# Patient Record
Sex: FEMALE | Race: WHITE | NOT HISPANIC OR LATINO
[De-identification: names, ages, dates, MRNs, and addresses within clinical notes are randomized per-mention and may not be internally consistent; named-entity substitution may affect disease eponyms.]

---

## 2017-03-10 ENCOUNTER — APPOINTMENT (OUTPATIENT)
Dept: PULMONOLOGY | Facility: CLINIC | Age: 77
End: 2017-03-10

## 2017-03-10 VITALS
HEART RATE: 74 BPM | DIASTOLIC BLOOD PRESSURE: 60 MMHG | HEIGHT: 58 IN | OXYGEN SATURATION: 90 % | SYSTOLIC BLOOD PRESSURE: 120 MMHG | BODY MASS INDEX: 20.69 KG/M2

## 2017-03-10 VITALS — BODY MASS INDEX: 20.69 KG/M2 | WEIGHT: 99 LBS

## 2017-06-14 ENCOUNTER — APPOINTMENT (OUTPATIENT)
Dept: PULMONOLOGY | Facility: CLINIC | Age: 77
End: 2017-06-14

## 2017-06-14 VITALS
WEIGHT: 99 LBS | HEART RATE: 73 BPM | OXYGEN SATURATION: 97 % | BODY MASS INDEX: 20.69 KG/M2 | DIASTOLIC BLOOD PRESSURE: 64 MMHG | SYSTOLIC BLOOD PRESSURE: 110 MMHG

## 2017-06-14 RX ORDER — PREDNISONE 50 MG/1
50 TABLET ORAL
Qty: 5 | Refills: 0 | Status: COMPLETED | COMMUNITY
Start: 2017-03-10 | End: 2017-06-14

## 2017-06-14 RX ORDER — LEVALBUTEROL HYDROCHLORIDE 1.25 MG/3ML
1.25 SOLUTION RESPIRATORY (INHALATION)
Qty: 288 | Refills: 0 | Status: DISCONTINUED | COMMUNITY
Start: 2017-03-10

## 2017-07-25 ENCOUNTER — APPOINTMENT (OUTPATIENT)
Dept: PULMONOLOGY | Facility: CLINIC | Age: 77
End: 2017-07-25

## 2017-07-25 VITALS — WEIGHT: 100 LBS | BODY MASS INDEX: 20.9 KG/M2

## 2017-07-25 VITALS — SYSTOLIC BLOOD PRESSURE: 118 MMHG | HEART RATE: 74 BPM | OXYGEN SATURATION: 93 % | DIASTOLIC BLOOD PRESSURE: 74 MMHG

## 2017-10-27 ENCOUNTER — APPOINTMENT (OUTPATIENT)
Dept: PULMONOLOGY | Facility: CLINIC | Age: 77
End: 2017-10-27
Payer: MEDICARE

## 2017-10-27 VITALS
DIASTOLIC BLOOD PRESSURE: 60 MMHG | SYSTOLIC BLOOD PRESSURE: 110 MMHG | BODY MASS INDEX: 20.27 KG/M2 | HEART RATE: 77 BPM | OXYGEN SATURATION: 96 % | WEIGHT: 97 LBS

## 2017-10-27 PROCEDURE — 99215 OFFICE O/P EST HI 40 MIN: CPT

## 2018-01-23 ENCOUNTER — APPOINTMENT (OUTPATIENT)
Dept: PULMONOLOGY | Facility: CLINIC | Age: 78
End: 2018-01-23
Payer: MEDICARE

## 2018-01-23 VITALS — BODY MASS INDEX: 20.48 KG/M2 | SYSTOLIC BLOOD PRESSURE: 120 MMHG | WEIGHT: 98 LBS | DIASTOLIC BLOOD PRESSURE: 60 MMHG

## 2018-01-23 VITALS — HEART RATE: 79 BPM | OXYGEN SATURATION: 94 %

## 2018-01-23 PROCEDURE — 99214 OFFICE O/P EST MOD 30 MIN: CPT

## 2018-02-09 ENCOUNTER — RECORD ABSTRACTING (OUTPATIENT)
Age: 78
End: 2018-02-09

## 2018-02-09 DIAGNOSIS — R26.9 UNSPECIFIED ABNORMALITIES OF GAIT AND MOBILITY: ICD-10-CM

## 2018-02-09 RX ORDER — ATORVASTATIN CALCIUM 10 MG/1
10 TABLET, FILM COATED ORAL DAILY
Refills: 0 | Status: ACTIVE | COMMUNITY
Start: 2017-07-25

## 2018-03-19 ENCOUNTER — APPOINTMENT (OUTPATIENT)
Dept: CARDIOLOGY | Facility: CLINIC | Age: 78
End: 2018-03-19
Payer: MEDICARE

## 2018-03-19 VITALS
RESPIRATION RATE: 12 BRPM | SYSTOLIC BLOOD PRESSURE: 134 MMHG | HEART RATE: 79 BPM | BODY MASS INDEX: 20.81 KG/M2 | DIASTOLIC BLOOD PRESSURE: 68 MMHG | WEIGHT: 99.13 LBS | HEIGHT: 58 IN

## 2018-03-19 PROCEDURE — 93000 ELECTROCARDIOGRAM COMPLETE: CPT

## 2018-03-19 PROCEDURE — 99214 OFFICE O/P EST MOD 30 MIN: CPT

## 2018-04-26 ENCOUNTER — APPOINTMENT (OUTPATIENT)
Dept: PULMONOLOGY | Facility: CLINIC | Age: 78
End: 2018-04-26
Payer: MEDICARE

## 2018-04-26 VITALS — DIASTOLIC BLOOD PRESSURE: 60 MMHG | WEIGHT: 100 LBS | BODY MASS INDEX: 20.9 KG/M2 | SYSTOLIC BLOOD PRESSURE: 120 MMHG

## 2018-04-26 VITALS — HEART RATE: 79 BPM | OXYGEN SATURATION: 94 %

## 2018-04-26 PROCEDURE — 99214 OFFICE O/P EST MOD 30 MIN: CPT

## 2018-05-12 ENCOUNTER — APPOINTMENT (OUTPATIENT)
Dept: CARDIOLOGY | Facility: CLINIC | Age: 78
End: 2018-05-12
Payer: MEDICARE

## 2018-05-12 PROCEDURE — 93978 VASCULAR STUDY: CPT

## 2018-05-14 ENCOUNTER — APPOINTMENT (OUTPATIENT)
Dept: CARDIOLOGY | Facility: CLINIC | Age: 78
End: 2018-05-14

## 2018-05-17 ENCOUNTER — APPOINTMENT (OUTPATIENT)
Dept: CARDIOLOGY | Facility: CLINIC | Age: 78
End: 2018-05-17

## 2018-06-21 ENCOUNTER — RECORD ABSTRACTING (OUTPATIENT)
Age: 78
End: 2018-06-21

## 2018-06-21 RX ORDER — AMOXICILLIN 500 MG/1
500 CAPSULE ORAL
Qty: 12 | Refills: 0 | Status: COMPLETED | COMMUNITY
Start: 2018-01-02

## 2018-07-05 ENCOUNTER — APPOINTMENT (OUTPATIENT)
Dept: CARDIOLOGY | Facility: CLINIC | Age: 78
End: 2018-07-05
Payer: MEDICARE

## 2018-07-05 VITALS
HEIGHT: 58 IN | SYSTOLIC BLOOD PRESSURE: 123 MMHG | DIASTOLIC BLOOD PRESSURE: 70 MMHG | RESPIRATION RATE: 12 BRPM | BODY MASS INDEX: 20.57 KG/M2 | WEIGHT: 98 LBS | HEART RATE: 74 BPM

## 2018-07-05 DIAGNOSIS — Z86.79 PERSONAL HISTORY OF OTHER DISEASES OF THE CIRCULATORY SYSTEM: ICD-10-CM

## 2018-07-05 PROCEDURE — 93000 ELECTROCARDIOGRAM COMPLETE: CPT

## 2018-07-05 PROCEDURE — 99214 OFFICE O/P EST MOD 30 MIN: CPT

## 2018-08-27 ENCOUNTER — APPOINTMENT (OUTPATIENT)
Dept: PULMONOLOGY | Facility: CLINIC | Age: 78
End: 2018-08-27
Payer: MEDICARE

## 2018-08-27 VITALS
SYSTOLIC BLOOD PRESSURE: 118 MMHG | OXYGEN SATURATION: 94 % | HEART RATE: 73 BPM | DIASTOLIC BLOOD PRESSURE: 70 MMHG | RESPIRATION RATE: 16 BRPM

## 2018-08-27 VITALS — HEIGHT: 72 IN

## 2018-08-27 PROCEDURE — 99214 OFFICE O/P EST MOD 30 MIN: CPT | Mod: 25

## 2018-08-27 PROCEDURE — 94010 BREATHING CAPACITY TEST: CPT

## 2018-09-21 ENCOUNTER — APPOINTMENT (OUTPATIENT)
Dept: CARDIOLOGY | Facility: CLINIC | Age: 78
End: 2018-09-21
Payer: MEDICARE

## 2018-09-21 PROCEDURE — 93306 TTE W/DOPPLER COMPLETE: CPT

## 2018-10-29 ENCOUNTER — RX RENEWAL (OUTPATIENT)
Age: 78
End: 2018-10-29

## 2018-11-07 ENCOUNTER — APPOINTMENT (OUTPATIENT)
Dept: CARDIOLOGY | Facility: CLINIC | Age: 78
End: 2018-11-07
Payer: MEDICARE

## 2018-11-07 VITALS
HEIGHT: 60 IN | WEIGHT: 97 LBS | RESPIRATION RATE: 16 BRPM | HEART RATE: 77 BPM | BODY MASS INDEX: 19.04 KG/M2 | DIASTOLIC BLOOD PRESSURE: 63 MMHG | SYSTOLIC BLOOD PRESSURE: 122 MMHG

## 2018-11-07 PROCEDURE — 93000 ELECTROCARDIOGRAM COMPLETE: CPT

## 2018-11-07 PROCEDURE — 99214 OFFICE O/P EST MOD 30 MIN: CPT

## 2018-11-07 NOTE — REASON FOR VISIT
[Follow-Up - Clinic] : a clinic follow-up of [FreeTextEntry1] : The patient is a 77-year-old white female with long-standing history of chronic lung disease/COPD on chronic O2 nasal cannula therapy presents back to the office today for management of history of mild atherosclerotic heart disease, mild valvular insufficiency and borderline hypertension with occasional palpitations of the chest.\par \par She reports that she gets infrequent palpitations of the chest. She has had her usual dyspnea with any significant activity and still using O2 nasal cannula 100% of the time. She had a recent checkup with Dr. Castellano from the pulmonary standpoint who feels her condition is chronic but stable;\par \par She denies chest pain,  dizziness,  or syncope;;

## 2018-11-07 NOTE — PHYSICAL EXAM
[General Appearance - Well Developed] : well developed [Normal Appearance] : normal appearance [Well Groomed] : well groomed [General Appearance - Well Nourished] : well nourished [No Deformities] : no deformities [General Appearance - In No Acute Distress] : no acute distress [Normal Conjunctiva] : the conjunctiva exhibited no abnormalities [Eyelids - No Xanthelasma] : the eyelids demonstrated no xanthelasmas [Normal Oral Mucosa] : normal oral mucosa [No Oral Pallor] : no oral pallor [No Oral Cyanosis] : no oral cyanosis [Normal Jugular Venous A Waves Present] : normal jugular venous A waves present [Normal Jugular Venous V Waves Present] : normal jugular venous V waves present [No Jugular Venous Olivares A Waves] : no jugular venous olivares A waves [Abdomen Soft] : soft [Abdomen Tenderness] : non-tender [] : no hepato-splenomegaly [Abdomen Mass (___ Cm)] : no abdominal mass palpated [Abnormal Walk] : normal gait [Gait - Sufficient For Exercise Testing] : the gait was sufficient for exercise testing [Nail Clubbing] : no clubbing of the fingernails [Cyanosis, Localized] : no localized cyanosis [FreeTextEntry1] : See above noted [Oriented To Time, Place, And Person] : oriented to person, place, and time [Affect] : the affect was normal [Mood] : the mood was normal [No Anxiety] : not feeling anxious

## 2018-11-07 NOTE — HISTORY OF PRESENT ILLNESS
[FreeTextEntry1] : Patient has had some other additional issues of recent which include iron deficient anemia upon evaluation from GI standpoint (Dr. Dickey)-who feels patient's chronic condition does not lend itself to any invasive checkup right now and recommended iron infusions;\par \par There is no overt bleeding noted\par Patient has been seeing hematology (Dr.. Palm);\par \par She was also recently treated for possible cellulitis of the left lower extremity with antibiotics;

## 2018-11-07 NOTE — ASSESSMENT
[FreeTextEntry1] : EKG demonstrated normal sinus rhythm at a rate of 77, biatrial enlargement, poor R-wave progression V1 to V2;\par \par \par In summary, the patient is a 77-year-old woman with history of mild atherosclerosis, abnormal EKG, advanced COPD who is O2 nasal cannula dependent who presents back to the office to discuss results of recent transthoracic echo which demonstrated the following:\par \par Normal cardiac chamber sizes and normal systolic function of the left ventricle with normal ejection fraction in the range of 60-65%, trace AI and PI mild MR and mild elevated PA systolic pressures in the range of 40 mm of mercury;\par \par \par Plan:\par \par No additional cardiac workup indicated at this time\par \par Followup to this office within 4-5 months or p.r.n.\par \par Timely checkups and laboratory blood tests with primary care encouraged;;

## 2018-11-29 ENCOUNTER — APPOINTMENT (OUTPATIENT)
Dept: PULMONOLOGY | Facility: CLINIC | Age: 78
End: 2018-11-29

## 2018-12-04 ENCOUNTER — APPOINTMENT (OUTPATIENT)
Dept: PULMONOLOGY | Facility: CLINIC | Age: 78
End: 2018-12-04
Payer: MEDICARE

## 2018-12-04 VITALS — SYSTOLIC BLOOD PRESSURE: 120 MMHG | BODY MASS INDEX: 18.16 KG/M2 | WEIGHT: 93 LBS | DIASTOLIC BLOOD PRESSURE: 64 MMHG

## 2018-12-04 VITALS — HEART RATE: 74 BPM | OXYGEN SATURATION: 95 %

## 2018-12-04 PROCEDURE — 99214 OFFICE O/P EST MOD 30 MIN: CPT

## 2018-12-04 RX ORDER — LEVALBUTEROL 1.25 MG/.5ML
1.25 SOLUTION, CONCENTRATE RESPIRATORY (INHALATION) 4 TIMES DAILY
Refills: 3 | Status: ACTIVE | COMMUNITY
Start: 2017-03-10

## 2019-03-11 ENCOUNTER — APPOINTMENT (OUTPATIENT)
Dept: CARDIOLOGY | Facility: CLINIC | Age: 79
End: 2019-03-11
Payer: MEDICARE

## 2019-03-11 ENCOUNTER — NON-APPOINTMENT (OUTPATIENT)
Age: 79
End: 2019-03-11

## 2019-03-11 VITALS
DIASTOLIC BLOOD PRESSURE: 74 MMHG | HEIGHT: 60 IN | SYSTOLIC BLOOD PRESSURE: 128 MMHG | RESPIRATION RATE: 16 BRPM | BODY MASS INDEX: 18.65 KG/M2 | WEIGHT: 95 LBS | HEART RATE: 70 BPM

## 2019-03-11 PROCEDURE — 93000 ELECTROCARDIOGRAM COMPLETE: CPT

## 2019-03-11 PROCEDURE — 99214 OFFICE O/P EST MOD 30 MIN: CPT

## 2019-03-11 NOTE — HISTORY OF PRESENT ILLNESS
[FreeTextEntry1] : She has not had a recent significant symptoms of chest pain, palpitations, dizziness or syncope;\par \par She is in the process and planning to have some additional significant treatment and dental work in the near future;\par \par

## 2019-03-11 NOTE — REASON FOR VISIT
[Follow-Up - Clinic] : a clinic follow-up of [FreeTextEntry1] : The patient is an 78-year-old white female with a known history of advanced COPD and chronic O2 nasal cannula dependent who presents back to the office today for general cardiac checkup.;\par \par She has a history of an abnormal EKG, occasional palpitations in the chest, and mild valvular insufficiency;

## 2019-03-11 NOTE — ASSESSMENT
[FreeTextEntry1] : EKG shows normal sinus rhythm at a rate of 70 with some poor R wave progression V1 to V2 and noted peak T waves;\par \par In summary the patient is a-year-old woman with known history of advanced COPD and O2 nasal cannula oxygen dependent who has had a history of mild insufficiency (MR TR, and otherwise stable cardiac pattern with no significant coronary artery disease or prior cardiac catheterization but positive for significant to severe pulmonary hypertension;\par \par Plan:\par \par No additional cardiac workup indicated at this time;\par \par Patient to followup to primary care, pulmonary and hematology for history of iron deficient anemia and prior history of iron infusions and sees Dr. Palm);\par \par Followup in this office within 6 months or p.r.n.

## 2019-03-27 ENCOUNTER — EMERGENCY (EMERGENCY)
Facility: HOSPITAL | Age: 79
LOS: 1 days | Discharge: HOME | End: 2019-03-27
Attending: EMERGENCY MEDICINE
Payer: MEDICARE

## 2019-03-27 VITALS
RESPIRATION RATE: 18 BRPM | SYSTOLIC BLOOD PRESSURE: 127 MMHG | HEART RATE: 72 BPM | TEMPERATURE: 98 F | DIASTOLIC BLOOD PRESSURE: 74 MMHG | OXYGEN SATURATION: 98 %

## 2019-03-27 VITALS
OXYGEN SATURATION: 98 % | TEMPERATURE: 98 F | SYSTOLIC BLOOD PRESSURE: 132 MMHG | DIASTOLIC BLOOD PRESSURE: 73 MMHG | RESPIRATION RATE: 18 BRPM | HEART RATE: 77 BPM

## 2019-03-27 LAB
ALBUMIN SERPL ELPH-MCNC: 4 G/DL — SIGNIFICANT CHANGE UP (ref 3.3–5.2)
ALP SERPL-CCNC: 128 U/L — HIGH (ref 40–120)
ALT FLD-CCNC: 32 U/L — SIGNIFICANT CHANGE UP
ANION GAP SERPL CALC-SCNC: 11 MMOL/L — SIGNIFICANT CHANGE UP (ref 5–17)
APTT BLD: 30.3 SEC — SIGNIFICANT CHANGE UP (ref 27.5–36.3)
AST SERPL-CCNC: 41 U/L — HIGH
BASE EXCESS BLDV CALC-SCNC: 7.1 MMOL/L — HIGH (ref -2–2)
BASOPHILS # BLD AUTO: 0 K/UL — SIGNIFICANT CHANGE UP (ref 0–0.2)
BASOPHILS NFR BLD AUTO: 0.4 % — SIGNIFICANT CHANGE UP (ref 0–2)
BILIRUB SERPL-MCNC: 0.3 MG/DL — LOW (ref 0.4–2)
BLOOD GAS COMMENTS, VENOUS: SIGNIFICANT CHANGE UP
BUN SERPL-MCNC: 9 MG/DL — SIGNIFICANT CHANGE UP (ref 8–20)
CALCIUM SERPL-MCNC: 9.6 MG/DL — SIGNIFICANT CHANGE UP (ref 8.6–10.2)
CHLORIDE SERPL-SCNC: 100 MMOL/L — SIGNIFICANT CHANGE UP (ref 98–107)
CK SERPL-CCNC: 45 U/L — SIGNIFICANT CHANGE UP (ref 25–170)
CK SERPL-CCNC: 69 U/L — SIGNIFICANT CHANGE UP (ref 25–170)
CO2 SERPL-SCNC: 31 MMOL/L — HIGH (ref 22–29)
CREAT SERPL-MCNC: 0.64 MG/DL — SIGNIFICANT CHANGE UP (ref 0.5–1.3)
EOSINOPHIL # BLD AUTO: 0.3 K/UL — SIGNIFICANT CHANGE UP (ref 0–0.5)
EOSINOPHIL NFR BLD AUTO: 4.1 % — SIGNIFICANT CHANGE UP (ref 0–6)
GAS PNL BLDV: SIGNIFICANT CHANGE UP
GLUCOSE SERPL-MCNC: 111 MG/DL — SIGNIFICANT CHANGE UP (ref 70–115)
HCO3 BLDV-SCNC: 30 MMOL/L — HIGH (ref 21–29)
HCT VFR BLD CALC: 39.7 % — SIGNIFICANT CHANGE UP (ref 37–47)
HGB BLD-MCNC: 12.5 G/DL — SIGNIFICANT CHANGE UP (ref 12–16)
HOROWITZ INDEX BLDV+IHG-RTO: SIGNIFICANT CHANGE UP
INR BLD: 1 RATIO — SIGNIFICANT CHANGE UP (ref 0.88–1.16)
LIDOCAIN IGE QN: 50 U/L — SIGNIFICANT CHANGE UP (ref 22–51)
LYMPHOCYTES # BLD AUTO: 1 K/UL — SIGNIFICANT CHANGE UP (ref 1–4.8)
LYMPHOCYTES # BLD AUTO: 11.7 % — LOW (ref 20–55)
MCHC RBC-ENTMCNC: 31.2 PG — HIGH (ref 27–31)
MCHC RBC-ENTMCNC: 31.5 G/DL — LOW (ref 32–36)
MCV RBC AUTO: 99 FL — SIGNIFICANT CHANGE UP (ref 81–99)
MONOCYTES # BLD AUTO: 0.4 K/UL — SIGNIFICANT CHANGE UP (ref 0–0.8)
MONOCYTES NFR BLD AUTO: 4.9 % — SIGNIFICANT CHANGE UP (ref 3–10)
NEUTROPHILS # BLD AUTO: 6.4 K/UL — SIGNIFICANT CHANGE UP (ref 1.8–8)
NEUTROPHILS NFR BLD AUTO: 78.7 % — HIGH (ref 37–73)
PCO2 BLDV: 63 MMHG — HIGH (ref 35–50)
PH BLDV: 7.35 — SIGNIFICANT CHANGE UP (ref 7.32–7.43)
PLATELET # BLD AUTO: 286 K/UL — SIGNIFICANT CHANGE UP (ref 150–400)
PO2 BLDV: 44 MMHG — SIGNIFICANT CHANGE UP (ref 25–45)
POTASSIUM SERPL-MCNC: 4.6 MMOL/L — SIGNIFICANT CHANGE UP (ref 3.5–5.3)
POTASSIUM SERPL-SCNC: 4.6 MMOL/L — SIGNIFICANT CHANGE UP (ref 3.5–5.3)
PROT SERPL-MCNC: 7 G/DL — SIGNIFICANT CHANGE UP (ref 6.6–8.7)
PROTHROM AB SERPL-ACNC: 11.5 SEC — SIGNIFICANT CHANGE UP (ref 10–12.9)
RBC # BLD: 4.01 M/UL — LOW (ref 4.4–5.2)
RBC # FLD: 12.6 % — SIGNIFICANT CHANGE UP (ref 11–15.6)
SAO2 % BLDV: 81 % — SIGNIFICANT CHANGE UP
SODIUM SERPL-SCNC: 142 MMOL/L — SIGNIFICANT CHANGE UP (ref 135–145)
TROPONIN T SERPL-MCNC: <0.01 NG/ML — SIGNIFICANT CHANGE UP (ref 0–0.06)
TROPONIN T SERPL-MCNC: <0.01 NG/ML — SIGNIFICANT CHANGE UP (ref 0–0.06)
WBC # BLD: 8.1 K/UL — SIGNIFICANT CHANGE UP (ref 4.8–10.8)
WBC # FLD AUTO: 8.1 K/UL — SIGNIFICANT CHANGE UP (ref 4.8–10.8)

## 2019-03-27 PROCEDURE — 83690 ASSAY OF LIPASE: CPT

## 2019-03-27 PROCEDURE — 80053 COMPREHEN METABOLIC PANEL: CPT

## 2019-03-27 PROCEDURE — 99222 1ST HOSP IP/OBS MODERATE 55: CPT

## 2019-03-27 PROCEDURE — 70450 CT HEAD/BRAIN W/O DYE: CPT

## 2019-03-27 PROCEDURE — 93010 ELECTROCARDIOGRAM REPORT: CPT

## 2019-03-27 PROCEDURE — 85610 PROTHROMBIN TIME: CPT

## 2019-03-27 PROCEDURE — 85027 COMPLETE CBC AUTOMATED: CPT

## 2019-03-27 PROCEDURE — 99284 EMERGENCY DEPT VISIT MOD MDM: CPT | Mod: 25

## 2019-03-27 PROCEDURE — 99284 EMERGENCY DEPT VISIT MOD MDM: CPT

## 2019-03-27 PROCEDURE — 71046 X-RAY EXAM CHEST 2 VIEWS: CPT | Mod: 26

## 2019-03-27 PROCEDURE — 85730 THROMBOPLASTIN TIME PARTIAL: CPT

## 2019-03-27 PROCEDURE — 84484 ASSAY OF TROPONIN QUANT: CPT

## 2019-03-27 PROCEDURE — 82803 BLOOD GASES ANY COMBINATION: CPT

## 2019-03-27 PROCEDURE — 36415 COLL VENOUS BLD VENIPUNCTURE: CPT

## 2019-03-27 PROCEDURE — 82550 ASSAY OF CK (CPK): CPT

## 2019-03-27 PROCEDURE — 93005 ELECTROCARDIOGRAM TRACING: CPT

## 2019-03-27 PROCEDURE — 71046 X-RAY EXAM CHEST 2 VIEWS: CPT

## 2019-03-27 PROCEDURE — 70450 CT HEAD/BRAIN W/O DYE: CPT | Mod: 26

## 2019-03-27 RX ORDER — TIOTROPIUM BROMIDE AND OLODATEROL 3.124; 2.736 UG/1; UG/1
2 SPRAY, METERED RESPIRATORY (INHALATION)
Qty: 0 | Refills: 0 | COMMUNITY

## 2019-03-27 RX ORDER — FOLIC ACID 0.8 MG
1 TABLET ORAL
Qty: 0 | Refills: 0 | COMMUNITY

## 2019-03-27 RX ORDER — SODIUM CHLORIDE 9 MG/ML
500 INJECTION INTRAMUSCULAR; INTRAVENOUS; SUBCUTANEOUS ONCE
Qty: 0 | Refills: 0 | Status: COMPLETED | OUTPATIENT
Start: 2019-03-27 | End: 2019-03-27

## 2019-03-27 RX ORDER — OMEPRAZOLE 10 MG/1
1 CAPSULE, DELAYED RELEASE ORAL
Qty: 0 | Refills: 0 | COMMUNITY

## 2019-03-27 RX ORDER — ATENOLOL 25 MG/1
0 TABLET ORAL
Qty: 0 | Refills: 0 | COMMUNITY

## 2019-03-27 RX ORDER — ATORVASTATIN CALCIUM 80 MG/1
1 TABLET, FILM COATED ORAL
Qty: 0 | Refills: 0 | COMMUNITY

## 2019-03-27 RX ORDER — AMOXICILLIN 250 MG/5ML
975 SUSPENSION, RECONSTITUTED, ORAL (ML) ORAL
Qty: 0 | Refills: 0 | COMMUNITY

## 2019-03-27 RX ORDER — ASPIRIN/CALCIUM CARB/MAGNESIUM 324 MG
1 TABLET ORAL
Qty: 0 | Refills: 0 | COMMUNITY

## 2019-03-27 RX ORDER — LEVALBUTEROL 1.25 MG/.5ML
0 SOLUTION, CONCENTRATE RESPIRATORY (INHALATION)
Qty: 0 | Refills: 0 | COMMUNITY

## 2019-03-27 RX ORDER — AMITRIPTYLINE HCL 25 MG
1 TABLET ORAL
Qty: 0 | Refills: 0 | COMMUNITY

## 2019-03-27 RX ORDER — ALPRAZOLAM 0.25 MG
1 TABLET ORAL
Qty: 0 | Refills: 0 | COMMUNITY

## 2019-03-27 RX ADMIN — SODIUM CHLORIDE 500 MILLILITER(S): 9 INJECTION INTRAMUSCULAR; INTRAVENOUS; SUBCUTANEOUS at 13:40

## 2019-03-27 NOTE — ED ADULT NURSE NOTE - NSIMPLEMENTINTERV_GEN_ALL_ED
Implemented All Universal Safety Interventions:  Jud to call system. Call bell, personal items and telephone within reach. Instruct patient to call for assistance. Room bathroom lighting operational. Non-slip footwear when patient is off stretcher. Physically safe environment: no spills, clutter or unnecessary equipment. Stretcher in lowest position, wheels locked, appropriate side rails in place.

## 2019-03-27 NOTE — ED ADULT TRIAGE NOTE - CHIEF COMPLAINT QUOTE
pt presents to ED c/o intermittent chest discomfort pt presents to ED c/o intermittent chest discomfort since yesterday.  also has SOB.  hx pulmonary edema.

## 2019-03-27 NOTE — ED ADULT NURSE NOTE - CHIEF COMPLAINT QUOTE
pt presents to ED c/o intermittent chest discomfort since yesterday.  also has SOB.  hx pulmonary edema.

## 2019-03-27 NOTE — ED ADULT NURSE NOTE - CHPI ED NUR SYMPTOMS NEG
no shortness of breath/no congestion/no fever/no back pain/no nausea/no diaphoresis/no chills/no syncope/no vomiting/no dizziness

## 2019-03-27 NOTE — ED ADULT NURSE NOTE - CAS EDN DISCHARGE ASSESSMENT
Awake/No adverse reaction to first time med in ED/Alert and oriented to person, place and time/Patient baseline mental status/Dressing clean and dry/Symptoms improved

## 2019-03-27 NOTE — ED PROVIDER NOTE - CLINICAL SUMMARY MEDICAL DECISION MAKING FREE TEXT BOX
Pt with atypical chest [pain, trop neg x 2 and seen and cleared by cardio. Pt had lightheadedness, resolved with IVF and likely related to clinical dehydration. Now tolerating PO. Stable for dc

## 2019-03-27 NOTE — ED PROVIDER NOTE - OBJECTIVE STATEMENT
78 year old female with PMh including COPD/Pul HTN on home O2, HTN presents with chest pain. Pt states that beginning yesterday she began to have an intermittent chest pain. Pain is described as a soreness, no radiation, and not related to deep inspiration, exertion, position. She denies worsening of her baseline dyspnea, and no cough, abd pain, vomiting, diarrhea. 78 year old female with PMh including COPD/Pul HTN on home O2, HTN presents with chest pain. Pt states that beginning yesterday she began to have an intermittent chest pain. Pain is described as a soreness, no radiation, and not related to deep inspiration, exertion, position. She denies worsening of her baseline dyspnea, and no cough, abd pain, vomiting, diarrhea.  Of note, pt recently had tooth pulled and has had decreased PO intake since then

## 2019-03-27 NOTE — ED PROVIDER NOTE - ENMT, MLM
Airway patent, Nasal mucosa clear. Mouth with normal mucosa. Throat has no vesicles, no oropharyngeal exudates and uvula is midline. Airway patent, Nasal mucosa clear. Mouth with normal mucosa. Throat has no vesicles, no oropharyngeal exudates and uvula is midline. +dry mucus membranes

## 2019-03-27 NOTE — ED ADULT NURSE NOTE - OBJECTIVE STATEMENT
pt AOX4 c/o chest discomfort this morning and "not feeling well" pt has hx of pulmonary HTN and uses oxygen at all times. pt denies any SOB, palpitations, fevers, chills, N/V

## 2019-04-04 ENCOUNTER — APPOINTMENT (OUTPATIENT)
Dept: PULMONOLOGY | Facility: CLINIC | Age: 79
End: 2019-04-04
Payer: MEDICARE

## 2019-04-04 VITALS — BODY MASS INDEX: 19.31 KG/M2 | WEIGHT: 92 LBS | HEIGHT: 58 IN

## 2019-04-04 VITALS — OXYGEN SATURATION: 95 % | SYSTOLIC BLOOD PRESSURE: 126 MMHG | DIASTOLIC BLOOD PRESSURE: 60 MMHG | HEART RATE: 76 BPM

## 2019-04-04 PROBLEM — J43.9 EMPHYSEMA, UNSPECIFIED: Chronic | Status: ACTIVE | Noted: 2019-03-27

## 2019-04-04 PROBLEM — M48.00 SPINAL STENOSIS, SITE UNSPECIFIED: Chronic | Status: ACTIVE | Noted: 2019-03-27

## 2019-04-04 PROBLEM — I27.20 PULMONARY HYPERTENSION, UNSPECIFIED: Chronic | Status: ACTIVE | Noted: 2019-03-27

## 2019-04-04 PROBLEM — J44.9 CHRONIC OBSTRUCTIVE PULMONARY DISEASE, UNSPECIFIED: Chronic | Status: ACTIVE | Noted: 2019-03-27

## 2019-04-04 PROCEDURE — 99215 OFFICE O/P EST HI 40 MIN: CPT | Mod: 25

## 2019-04-04 PROCEDURE — 94010 BREATHING CAPACITY TEST: CPT

## 2019-04-04 NOTE — HISTORY OF PRESENT ILLNESS
[Doing Well] : doing well [Difficulty Breathing During Exertion] : stable dyspnea on exertion [Feelings Of Weakness On Exertion] : stable exercise intolerance [Cough] : denies coughing [Wheezing] : denies wheezing [Regional Soft Tissue Swelling Both Lower Extremities] : denies lower extremity edema [___] : worsened [unfilled] [___ Times a Day] : of [unfilled] time(s) a day [None] : None [Adherent] : the patient is adherent with ~his/her~ medication regimen [FreeTextEntry1] : 3/21 impacted molar \par Molar removed and  placed on augmentin x 1wk\par felt poorly for several days with chest tightness and back pain\par Patient was seen at [facility] emergency room, where she was found well compensated respiratory acidosis with pCO2 of 63. No other abnormalities were found and the patient was discharged. [de-identified] : group 3 pulm HTN [de-identified] : anemia improved [de-identified] : maintained 2lpm continuous.

## 2019-04-04 NOTE — PHYSICAL EXAM
[General Appearance - Well Developed] : well developed [Normal Appearance] : normal appearance [Well Groomed] : well groomed [General Appearance - Well Nourished] : well nourished [No Deformities] : no deformities [General Appearance - In No Acute Distress] : no acute distress [Normal Conjunctiva] : the conjunctiva exhibited no abnormalities [Eyelids - No Xanthelasma] : the eyelids demonstrated no xanthelasmas [Normal Oropharynx] : normal oropharynx [Neck Appearance] : the appearance of the neck was normal [Neck Cervical Mass (___cm)] : no neck mass was observed [Jugular Venous Distention Increased] : there was no jugular-venous distention [Thyroid Diffuse Enlargement] : the thyroid was not enlarged [Thyroid Nodule] : there were no palpable thyroid nodules [Heart Rate And Rhythm] : heart rate and rhythm were normal [Heart Sounds] : normal S1 and S2 [Murmurs] : no murmurs present [Respiration, Rhythm And Depth] : normal respiratory rhythm and effort [Exaggerated Use Of Accessory Muscles For Inspiration] : no accessory muscle use [Auscultation Breath Sounds / Voice Sounds] : lungs were clear to auscultation bilaterally [Decreased Breath Sounds] : decreased breath sounds [Abdomen Soft] : soft [Abdomen Tenderness] : non-tender [Abdomen Mass (___ Cm)] : no abdominal mass palpated [Abnormal Walk] : normal gait [Gait - Sufficient For Exercise Testing] : the gait was sufficient for exercise testing [Nail Clubbing] : no clubbing of the fingernails [Cyanosis, Localized] : no localized cyanosis [Petechial Hemorrhages (___cm)] : no petechial hemorrhages [Skin Color & Pigmentation] : normal skin color and pigmentation [] : no rash [No Venous Stasis] : no venous stasis [Skin Lesions] : no skin lesions [No Skin Ulcers] : no skin ulcer [No Xanthoma] : no  xanthoma was observed [Deep Tendon Reflexes (DTR)] : deep tendon reflexes were 2+ and symmetric [Sensation] : the sensory exam was normal to light touch and pinprick [No Focal Deficits] : no focal deficits

## 2019-04-04 NOTE — CONSULT LETTER
[Dear  ___] : Dear  [unfilled], [Consult Letter:] : I had the pleasure of evaluating your patient, [unfilled]. [Please see my note below.] : Please see my note below. [Sincerely,] : Sincerely, [FreeTextEntry3] : Wilbert Castellano MD FCCP\par Pulmonary/Critical Care/Sleep Medicine\par Department of Internal Medicine\par \par [facility] [employer]\par

## 2019-04-04 NOTE — REASON FOR VISIT
[Follow-Up] : a follow-up visit [COPD] : COPD [Pulmonary Hypertension] : pulmonary hypertension [FreeTextEntry2] : Pulmonary HTN

## 2019-04-04 NOTE — DISCUSSION/SUMMARY
[COPD] : chronic obstructive pulmonary disease [Oxygen-Dependent] : oxygen-dependent [Stable] : stable [Stage IV (Very Severe)] : stage IV (very severe) [None] : There are no changes in medication management [Supplemental Oxygen] : supplemental oxygen [FreeTextEntry1] : Patient has developed a well compensated artery acidosis. Presently, has no effect on mental status, and discussion for noninvasive ventilation nocturnally was had. Followup arterial blood gas will be performed in 8 weeks with a decision made then regarding nocturnal noninvasive ventilation

## 2019-04-04 NOTE — PROCEDURE
[___%] : current visit [unfilled]U% [FreeTextEntry1] : EXAM: XR CHEST PA LAT 2V \par \par PROCEDURE DATE: 03/27/2019 \par \par \par \par INTERPRETATION: \par \par INDICATION: Chest pain. \par \par PA and lateral views of chest. No previous studies are available for \par comparison. \par \par FINDINGS: \par \par The lungs are hyperaerated with chronic interstitial changes and emphysema \par since disease. There is no acute parenchymal consolidation. There is no \par pleural effusion. The cardiomediastinal silhouette is within normal limits. \par There is osteopenia and degenerative changes, mild thoracic kyphosis. \par \par IMPRESSION: \par No acute consolidation. \par \par \par TYSON RECINOS M.D., ATTENDING RADIOLOGIST \par This document has been electronically signed. Mar 27 2019 10:32AM \par \par Labs reviewed.\par Films reviewed on PACS with patient

## 2019-05-20 ENCOUNTER — OUTPATIENT (OUTPATIENT)
Dept: OUTPATIENT SERVICES | Facility: HOSPITAL | Age: 79
LOS: 1 days | End: 2019-05-20
Payer: MEDICARE

## 2019-05-20 DIAGNOSIS — J44.9 CHRONIC OBSTRUCTIVE PULMONARY DISEASE, UNSPECIFIED: ICD-10-CM

## 2019-05-20 LAB
BASE EXCESS BLDA CALC-SCNC: 8.2 MMOL/L — HIGH (ref -2–2)
BLOOD GAS COMMENTS ARTERIAL: SIGNIFICANT CHANGE UP
GAS PNL BLDA: SIGNIFICANT CHANGE UP
HCO3 BLDA-SCNC: 32 MMOL/L — HIGH (ref 20–26)
HOROWITZ INDEX BLDA+IHG-RTO: 2 — SIGNIFICANT CHANGE UP
PCO2 BLDA: 61 MMHG — HIGH (ref 35–45)
PH BLDA: 7.37 — SIGNIFICANT CHANGE UP (ref 7.35–7.45)
PO2 BLDA: 53 MMHG — LOW (ref 83–108)
SAO2 % BLDA: 89 % — LOW (ref 95–99)

## 2019-05-20 PROCEDURE — 82803 BLOOD GASES ANY COMBINATION: CPT

## 2019-06-07 ENCOUNTER — APPOINTMENT (OUTPATIENT)
Dept: PULMONOLOGY | Facility: CLINIC | Age: 79
End: 2019-06-07
Payer: MEDICARE

## 2019-06-07 VITALS
SYSTOLIC BLOOD PRESSURE: 144 MMHG | WEIGHT: 94 LBS | HEART RATE: 79 BPM | RESPIRATION RATE: 16 BRPM | BODY MASS INDEX: 19.65 KG/M2 | DIASTOLIC BLOOD PRESSURE: 80 MMHG | OXYGEN SATURATION: 95 %

## 2019-06-07 PROCEDURE — 99215 OFFICE O/P EST HI 40 MIN: CPT

## 2019-06-07 NOTE — PHYSICAL EXAM
[Normal Appearance] : normal appearance [General Appearance - Well Developed] : well developed [No Deformities] : no deformities [Well Groomed] : well groomed [General Appearance - Well Nourished] : well nourished [General Appearance - In No Acute Distress] : no acute distress [Eyelids - No Xanthelasma] : the eyelids demonstrated no xanthelasmas [Normal Conjunctiva] : the conjunctiva exhibited no abnormalities [Neck Cervical Mass (___cm)] : no neck mass was observed [Neck Appearance] : the appearance of the neck was normal [Normal Oropharynx] : normal oropharynx [Jugular Venous Distention Increased] : there was no jugular-venous distention [Thyroid Nodule] : there were no palpable thyroid nodules [Thyroid Diffuse Enlargement] : the thyroid was not enlarged [Murmurs] : no murmurs present [Heart Rate And Rhythm] : heart rate and rhythm were normal [Heart Sounds] : normal S1 and S2 [Respiration, Rhythm And Depth] : normal respiratory rhythm and effort [Exaggerated Use Of Accessory Muscles For Inspiration] : no accessory muscle use [Decreased Breath Sounds] : decreased breath sounds [Auscultation Breath Sounds / Voice Sounds] : lungs were clear to auscultation bilaterally [Abdomen Soft] : soft [Abdomen Tenderness] : non-tender [Abnormal Walk] : normal gait [Abdomen Mass (___ Cm)] : no abdominal mass palpated [Gait - Sufficient For Exercise Testing] : the gait was sufficient for exercise testing [Nail Clubbing] : no clubbing of the fingernails [Cyanosis, Localized] : no localized cyanosis [Petechial Hemorrhages (___cm)] : no petechial hemorrhages [No Venous Stasis] : no venous stasis [Skin Color & Pigmentation] : normal skin color and pigmentation [] : no rash [No Xanthoma] : no  xanthoma was observed [No Skin Ulcers] : no skin ulcer [Skin Lesions] : no skin lesions [Deep Tendon Reflexes (DTR)] : deep tendon reflexes were 2+ and symmetric [No Focal Deficits] : no focal deficits [Sensation] : the sensory exam was normal to light touch and pinprick

## 2019-06-07 NOTE — CONSULT LETTER
[Dear  ___] : Dear  [unfilled], [Please see my note below.] : Please see my note below. [Sincerely,] : Sincerely, [Consult Letter:] : I had the pleasure of evaluating your patient, [unfilled]. [FreeTextEntry3] : Wilbert Castellano MD FCCP\par Pulmonary/Critical Care/Sleep Medicine\par Department of Internal Medicine\par \par [facility] [employer]\par

## 2019-06-07 NOTE — HISTORY OF PRESENT ILLNESS
[Doing Well] : doing well [Difficulty Breathing During Exertion] : stable dyspnea on exertion [Feelings Of Weakness On Exertion] : stable exercise intolerance [Regional Soft Tissue Swelling Both Lower Extremities] : denies lower extremity edema [Cough] : denies coughing [Wheezing] : denies wheezing [None] : None [___] : worsened [unfilled] [___ Times a Day] : of [unfilled] time(s) a day [Adherent] : the patient is adherent with ~his/her~ medication regimen [de-identified] : maintained 2lpm continuous. [de-identified] : group 3 pulm HTN

## 2019-06-07 NOTE — DISCUSSION/SUMMARY
[COPD] : chronic obstructive pulmonary disease [Oxygen-Dependent] : oxygen-dependent [Responding to Treatment] : responding to treatment [Stable] : stable [None] : There are no changes in medication management [Stage IV (Very Severe)] : stage IV (very severe) [Patient] : the patient [de-identified] : Hypercapnic resp failure [de-identified] : Trilogy NIV [FreeTextEntry1] : Discussed Trilogy for HCRF\par Pt agrees

## 2019-06-07 NOTE — REASON FOR VISIT
[Follow-Up] : a follow-up visit [Pulmonary Hypertension] : pulmonary hypertension [COPD] : COPD [FreeTextEntry2] : Pulmonary HTN

## 2019-06-13 ENCOUNTER — APPOINTMENT (OUTPATIENT)
Dept: PULMONOLOGY | Facility: CLINIC | Age: 79
End: 2019-06-13

## 2019-06-14 ENCOUNTER — APPOINTMENT (OUTPATIENT)
Dept: PULMONOLOGY | Facility: CLINIC | Age: 79
End: 2019-06-14
Payer: MEDICARE

## 2019-06-14 VITALS
SYSTOLIC BLOOD PRESSURE: 124 MMHG | BODY MASS INDEX: 19.23 KG/M2 | OXYGEN SATURATION: 95 % | DIASTOLIC BLOOD PRESSURE: 80 MMHG | HEART RATE: 72 BPM | RESPIRATION RATE: 16 BRPM | WEIGHT: 92 LBS

## 2019-06-14 PROCEDURE — 99213 OFFICE O/P EST LOW 20 MIN: CPT | Mod: 25

## 2019-06-14 NOTE — PHYSICAL EXAM
[General Appearance - Well Developed] : well developed [Normal Appearance] : normal appearance [Well Groomed] : well groomed [General Appearance - Well Nourished] : well nourished [No Deformities] : no deformities [Normal Conjunctiva] : the conjunctiva exhibited no abnormalities [General Appearance - In No Acute Distress] : no acute distress [Normal Oropharynx] : normal oropharynx [Eyelids - No Xanthelasma] : the eyelids demonstrated no xanthelasmas [Thyroid Diffuse Enlargement] : the thyroid was not enlarged [Jugular Venous Distention Increased] : there was no jugular-venous distention [Neck Cervical Mass (___cm)] : no neck mass was observed [Neck Appearance] : the appearance of the neck was normal [Murmurs] : no murmurs present [Heart Sounds] : normal S1 and S2 [Heart Rate And Rhythm] : heart rate and rhythm were normal [Thyroid Nodule] : there were no palpable thyroid nodules [Respiration, Rhythm And Depth] : normal respiratory rhythm and effort [Exaggerated Use Of Accessory Muscles For Inspiration] : no accessory muscle use [Decreased Breath Sounds] : decreased breath sounds [Abdomen Soft] : soft [Auscultation Breath Sounds / Voice Sounds] : lungs were clear to auscultation bilaterally [Abdomen Mass (___ Cm)] : no abdominal mass palpated [Abdomen Tenderness] : non-tender [Abnormal Walk] : normal gait [Gait - Sufficient For Exercise Testing] : the gait was sufficient for exercise testing [Cyanosis, Localized] : no localized cyanosis [Nail Clubbing] : no clubbing of the fingernails [Petechial Hemorrhages (___cm)] : no petechial hemorrhages [Skin Color & Pigmentation] : normal skin color and pigmentation [] : no rash [Skin Lesions] : no skin lesions [No Venous Stasis] : no venous stasis [No Skin Ulcers] : no skin ulcer [No Xanthoma] : no  xanthoma was observed [Deep Tendon Reflexes (DTR)] : deep tendon reflexes were 2+ and symmetric [No Focal Deficits] : no focal deficits [Sensation] : the sensory exam was normal to light touch and pinprick

## 2019-06-14 NOTE — PROCEDURE
[FreeTextEntry1] : Exercise oximetry demonstrates a resting room saturation 91% desaturating to 80% with exercise, and we saturated to 92% on 2 L per minute, pulsed

## 2019-06-14 NOTE — REASON FOR VISIT
[COPD] : COPD [Follow-Up] : a follow-up visit [Pulmonary Hypertension] : pulmonary hypertension [FreeTextEntry2] : Pulmonary HTN

## 2019-06-14 NOTE — HISTORY OF PRESENT ILLNESS
[Doing Well] : doing well [Cough] : denies coughing [Feelings Of Weakness On Exertion] : stable exercise intolerance [Difficulty Breathing During Exertion] : stable dyspnea on exertion [Regional Soft Tissue Swelling Both Lower Extremities] : denies lower extremity edema [Wheezing] : denies wheezing [___] : worsened [unfilled] [___ Times a Day] : of [unfilled] time(s) a day [None] : None [Adherent] : the patient is adherent with ~his/her~ medication regimen [de-identified] : maintained 2lpm continuous at home  and 2lpm pulsed with exercise [de-identified] : group 3 pulm HTN

## 2019-06-14 NOTE — CONSULT LETTER
[Dear  ___] : Dear  [unfilled], [Sincerely,] : Sincerely, [Consult Letter:] : I had the pleasure of evaluating your patient, [unfilled]. [Please see my note below.] : Please see my note below. [FreeTextEntry3] : Wilbert Castellano MD FCCP\par Pulmonary/Critical Care/Sleep Medicine\par Department of Internal Medicine\par \par [facility] [employer]\par

## 2019-07-18 ENCOUNTER — APPOINTMENT (OUTPATIENT)
Dept: PULMONOLOGY | Facility: CLINIC | Age: 79
End: 2019-07-18
Payer: MEDICARE

## 2019-07-18 VITALS — OXYGEN SATURATION: 92 % | SYSTOLIC BLOOD PRESSURE: 130 MMHG | DIASTOLIC BLOOD PRESSURE: 70 MMHG

## 2019-07-18 VITALS — HEIGHT: 58 IN | BODY MASS INDEX: 18.89 KG/M2 | WEIGHT: 90 LBS

## 2019-07-18 PROCEDURE — 99215 OFFICE O/P EST HI 40 MIN: CPT

## 2019-07-18 NOTE — END OF VISIT
[>50% of Time Spent on Counseling for ____] : Greater than 50% of the encounter time was spent on counseling for [unfilled] [Time Spent: ___ minutes] : I have spent [unfilled] minutes of face to face time with the patient Never smoker

## 2019-08-08 ENCOUNTER — APPOINTMENT (OUTPATIENT)
Dept: PULMONOLOGY | Facility: CLINIC | Age: 79
End: 2019-08-08

## 2019-08-09 ENCOUNTER — RX RENEWAL (OUTPATIENT)
Age: 79
End: 2019-08-09

## 2019-08-29 ENCOUNTER — APPOINTMENT (OUTPATIENT)
Dept: CARDIOLOGY | Facility: CLINIC | Age: 79
End: 2019-08-29
Payer: MEDICARE

## 2019-08-29 ENCOUNTER — NON-APPOINTMENT (OUTPATIENT)
Age: 79
End: 2019-08-29

## 2019-08-29 VITALS
HEIGHT: 59 IN | SYSTOLIC BLOOD PRESSURE: 120 MMHG | WEIGHT: 89 LBS | HEART RATE: 84 BPM | DIASTOLIC BLOOD PRESSURE: 72 MMHG | RESPIRATION RATE: 15 BRPM | BODY MASS INDEX: 17.94 KG/M2

## 2019-08-29 DIAGNOSIS — Z87.898 PERSONAL HISTORY OF OTHER SPECIFIED CONDITIONS: ICD-10-CM

## 2019-08-29 DIAGNOSIS — Z86.79 PERSONAL HISTORY OF OTHER DISEASES OF THE CIRCULATORY SYSTEM: ICD-10-CM

## 2019-08-29 DIAGNOSIS — Z86.39 PERSONAL HISTORY OF OTHER ENDOCRINE, NUTRITIONAL AND METABOLIC DISEASE: ICD-10-CM

## 2019-08-29 PROCEDURE — 93000 ELECTROCARDIOGRAM COMPLETE: CPT

## 2019-08-29 PROCEDURE — 99214 OFFICE O/P EST MOD 30 MIN: CPT

## 2019-08-29 NOTE — PHYSICAL EXAM
[General Appearance - Well Developed] : well developed [Well Groomed] : well groomed [Normal Appearance] : normal appearance [No Deformities] : no deformities [General Appearance - Well Nourished] : well nourished [General Appearance - In No Acute Distress] : no acute distress [Normal Conjunctiva] : the conjunctiva exhibited no abnormalities [Eyelids - No Xanthelasma] : the eyelids demonstrated no xanthelasmas [Normal Oral Mucosa] : normal oral mucosa [No Oral Pallor] : no oral pallor [No Oral Cyanosis] : no oral cyanosis [Normal Jugular Venous V Waves Present] : normal jugular venous V waves present [Normal Jugular Venous A Waves Present] : normal jugular venous A waves present [No Jugular Venous Olivares A Waves] : no jugular venous olivares A waves [Abdomen Soft] : soft [Abdomen Tenderness] : non-tender [] : no hepato-splenomegaly [Abdomen Mass (___ Cm)] : no abdominal mass palpated [Gait - Sufficient For Exercise Testing] : the gait was sufficient for exercise testing [Abnormal Walk] : normal gait [Nail Clubbing] : no clubbing of the fingernails [Cyanosis, Localized] : no localized cyanosis [FreeTextEntry1] : Left lower extremity a circumscribed area of erythema with dry skin noted-- but improving; [Affect] : the affect was normal [Oriented To Time, Place, And Person] : oriented to person, place, and time [No Anxiety] : not feeling anxious [Mood] : the mood was normal

## 2019-08-29 NOTE — HISTORY OF PRESENT ILLNESS
[Doing Well] : doing well [Difficulty Breathing During Exertion] : stable dyspnea on exertion [Feelings Of Weakness On Exertion] : stable exercise intolerance [Cough] : denies coughing [Wheezing] : denies wheezing [Regional Soft Tissue Swelling Both Lower Extremities] : denies lower extremity edema [___ Times a Day] : of [unfilled] time(s) a day [None] : None [Adherent] : the patient is adherent with ~his/her~ medication regimen [de-identified] : group 3 pulm HTN [de-identified] : maintained 2lpm continuous at home  and 2lpm pulsed with exercise

## 2019-08-29 NOTE — ASSESSMENT
[FreeTextEntry1] : EKG shows normal sinus rhythm at a rate of 84 with poor R-wave progression V1 to V4 and baseline artifact;\par \par In summary the patient is an 78-year-old woman who has had occasional palpitations in the past and shortness of breath blamed on her chronic COPD with an O2 dependent and now having worsening symptoms after being found to be hypercapnic and not using adequately a BiPAP machine which was initially tried\par \par Patient reports that a life 2000 assisted ventilation machine was ordered and she is not noted any follow through with the insurance company to deliver this and to improve her symptoms and she is referred back and forth to the pulmonary office and insurance company;\par \par Plan:\par \par We'll speak to Dr. Castellano on the patient's behalf\par \par Documentation in the medical records for request for this machine from pulmonary were noted;\par \par Patient to follow to this office within 3-4 months or p.r.n.;;;

## 2019-08-29 NOTE — REASON FOR VISIT
[Follow-Up - Clinic] : a clinic follow-up of [FreeTextEntry1] : The patient is an 78-year-old woman with known history of advanced COPD who has been chronic O2 nasal dependent and more recently having worsening symptoms and found to be hypercapnic;\par \par From a cardiac standpoint she has been stable without any significant chest pain, palpitations, dizziness or syncope;

## 2019-08-29 NOTE — PROCEDURE
[FreeTextEntry1] : Arterial blood gases on 2 L nasal cannula drawn on 5/20/19 demonstrated a pH of 7.37/pCO2 of 61/pO2 of 53

## 2019-08-29 NOTE — HISTORY OF PRESENT ILLNESS
[FreeTextEntry1] : The patient is currently waiting for a Life 2000 assisted ventilation machine to be approved by her insurance company after being ordered by Dr. Castellano;\par \par She is now frustrated because Dr. Castellano apparently forwarded this request to the insurance company --and insurance company has no knowledge of this -or is missing information according to the patient's ;

## 2019-08-29 NOTE — REVIEW OF SYSTEMS
[Shortness Of Breath] : shortness of breath [Feeling Fatigued] : feeling fatigued [Anxiety] : anxiety [Dyspnea on exertion] : dyspnea during exertion [Negative] : Endocrine

## 2019-08-29 NOTE — DISCUSSION/SUMMARY
[FreeTextEntry1] : 78-year-old female with a history of hypercapnic respiratory failure, secondary to end-stage chronic obstructive lung disease. The patient is refusing the prescribed Trilogy noninvasive ventilation, secondary to cost. Long discussion was had with her  regarding the use of a Life 2000 assisted ventilation and they are in agreement. This will improve her ability to ambulate, perform daily functions, as well as sleep. This cannot be accomplished with nocturnal BiPAP alone\par \par This modality will improve quality of life, decrease hospitalizations and prolonged life.\par \par A discussion was also had regarding patient's expectations and her further wishes to therapy.

## 2019-08-29 NOTE — CONSULT LETTER
[Dear  ___] : Dear  [unfilled], [Consult Letter:] : I had the pleasure of evaluating your patient, [unfilled]. [Please see my note below.] : Please see my note below. [Sincerely,] : Sincerely, [FreeTextEntry3] : Wilbert Castellano MD FCCP\par Pulmonary/Critical Care/Sleep Medicine\par Department of Internal Medicine\par \par [facility] [employer]\par

## 2019-09-26 ENCOUNTER — APPOINTMENT (OUTPATIENT)
Dept: PULMONOLOGY | Facility: CLINIC | Age: 79
End: 2019-09-26
Payer: MEDICARE

## 2019-09-26 VITALS — WEIGHT: 85 LBS | HEART RATE: 82 BPM | BODY MASS INDEX: 17.84 KG/M2 | OXYGEN SATURATION: 93 % | HEIGHT: 57.68 IN

## 2019-09-26 VITALS — SYSTOLIC BLOOD PRESSURE: 120 MMHG | DIASTOLIC BLOOD PRESSURE: 78 MMHG

## 2019-09-26 PROCEDURE — 99214 OFFICE O/P EST MOD 30 MIN: CPT

## 2019-09-26 NOTE — PHYSICAL EXAM
[General Appearance - Well Developed] : well developed [Normal Appearance] : normal appearance [Well Groomed] : well groomed [No Deformities] : no deformities [General Appearance - Well Nourished] : well nourished [General Appearance - In No Acute Distress] : no acute distress [Normal Conjunctiva] : the conjunctiva exhibited no abnormalities [Eyelids - No Xanthelasma] : the eyelids demonstrated no xanthelasmas [Normal Oropharynx] : normal oropharynx [Neck Appearance] : the appearance of the neck was normal [Neck Cervical Mass (___cm)] : no neck mass was observed [Jugular Venous Distention Increased] : there was no jugular-venous distention [Thyroid Nodule] : there were no palpable thyroid nodules [Thyroid Diffuse Enlargement] : the thyroid was not enlarged [Heart Rate And Rhythm] : heart rate and rhythm were normal [Murmurs] : no murmurs present [Heart Sounds] : normal S1 and S2 [Respiration, Rhythm And Depth] : normal respiratory rhythm and effort [Exaggerated Use Of Accessory Muscles For Inspiration] : no accessory muscle use [Auscultation Breath Sounds / Voice Sounds] : lungs were clear to auscultation bilaterally [Decreased Breath Sounds] : decreased breath sounds [Abdomen Soft] : soft [Abdomen Tenderness] : non-tender [Abdomen Mass (___ Cm)] : no abdominal mass palpated [Abnormal Walk] : normal gait [Gait - Sufficient For Exercise Testing] : the gait was sufficient for exercise testing [Nail Clubbing] : no clubbing of the fingernails [Cyanosis, Localized] : no localized cyanosis [Petechial Hemorrhages (___cm)] : no petechial hemorrhages [] : no rash [Skin Color & Pigmentation] : normal skin color and pigmentation [Skin Lesions] : no skin lesions [No Venous Stasis] : no venous stasis [No Skin Ulcers] : no skin ulcer [No Xanthoma] : no  xanthoma was observed [Sensation] : the sensory exam was normal to light touch and pinprick [Deep Tendon Reflexes (DTR)] : deep tendon reflexes were 2+ and symmetric [No Focal Deficits] : no focal deficits

## 2019-09-26 NOTE — CONSULT LETTER
[Dear  ___] : Dear  [unfilled], [Consult Letter:] : I had the pleasure of evaluating your patient, [unfilled]. [Please see my note below.] : Please see my note below. [Sincerely,] : Sincerely, [FreeTextEntry3] : Wilbert Castellano MD FCCP\par Pulmonary/Critical Care/Sleep Medicine\par Department of Internal Medicine\par \par [facility] [employer]\par

## 2019-09-26 NOTE — HISTORY OF PRESENT ILLNESS
[Difficulty Breathing During Exertion] : stable dyspnea on exertion [Doing Well] : doing well [Feelings Of Weakness On Exertion] : stable exercise intolerance [Wheezing] : denies wheezing [Cough] : denies coughing [Regional Soft Tissue Swelling Both Lower Extremities] : denies lower extremity edema [___ Times a Day] : of [unfilled] time(s) a day [None] : None [Adherent] : the patient is adherent with ~his/her~ medication regimen [de-identified] : group 3 pulm HTN [de-identified] : dental issues [FreeTextEntry1] : Patient is yet to obtain noninvasive ventilation, secondary to insurance concerns [de-identified] : maintained 2lpm continuous at home  and 2lpm pulsed with exercise

## 2019-09-26 NOTE — DISCUSSION/SUMMARY
[FreeTextEntry1] : 78-year-old female with a history of stage IV chronic obstructive lung disease seen today in followup. Currently patient has been rejected for both a life 2000 assisted ventilation, as well as a Trilogy noninvasive ventilator. And, therefore, instituting BiPAP at 12/4 with O2 bled in at 2 L per minute. This modality will improve quality of life, decrease hospitalizations improve longevity

## 2019-10-02 NOTE — ED PROVIDER NOTE - CONSTITUTIONAL, MLM
Nahum Cerda when recently seen    Yu Serrato [facility])     shwanomma @ San Juan.  Dr Jesús Barron   â¢ Axis (Degrees)   64    R Axis (Degrees)   3    T Axis (Degrees)   25    REPORT TEXT   Normal sinus rhythm   Incomplete right bundle branch block   Borderline ECG   Confirmed by [facility] MD, [address] ([id number]) [address] [place] [address] Dr at [address], please use the Service to [facility] referral order . If you are looking for the [address] [place] [address] Pkwy, please use the Service to [address] [place] [address] referral order [id number]. [facility]. She states her current cardiologist normal... Well appearing, well nourished, awake, alert, oriented to person, place, time/situation and in no apparent distress.

## 2019-10-31 ENCOUNTER — RX RENEWAL (OUTPATIENT)
Age: 79
End: 2019-10-31

## 2019-11-08 ENCOUNTER — APPOINTMENT (OUTPATIENT)
Dept: PULMONOLOGY | Facility: CLINIC | Age: 79
End: 2019-11-08
Payer: MEDICARE

## 2019-11-08 VITALS
HEART RATE: 74 BPM | SYSTOLIC BLOOD PRESSURE: 100 MMHG | DIASTOLIC BLOOD PRESSURE: 60 MMHG | BODY MASS INDEX: 18.26 KG/M2 | WEIGHT: 87 LBS | OXYGEN SATURATION: 90 % | HEIGHT: 57.87 IN

## 2019-11-08 PROCEDURE — 99214 OFFICE O/P EST MOD 30 MIN: CPT

## 2019-11-08 NOTE — PHYSICAL EXAM
[General Appearance - Well Developed] : well developed [Normal Appearance] : normal appearance [Well Groomed] : well groomed [General Appearance - Well Nourished] : well nourished [No Deformities] : no deformities [General Appearance - In No Acute Distress] : no acute distress [Normal Conjunctiva] : the conjunctiva exhibited no abnormalities [Eyelids - No Xanthelasma] : the eyelids demonstrated no xanthelasmas [Normal Oropharynx] : normal oropharynx [Neck Appearance] : the appearance of the neck was normal [Jugular Venous Distention Increased] : there was no jugular-venous distention [Thyroid Nodule] : there were no palpable thyroid nodules [Neck Cervical Mass (___cm)] : no neck mass was observed [Thyroid Diffuse Enlargement] : the thyroid was not enlarged [Heart Sounds] : normal S1 and S2 [Heart Rate And Rhythm] : heart rate and rhythm were normal [Murmurs] : no murmurs present [Respiration, Rhythm And Depth] : normal respiratory rhythm and effort [Auscultation Breath Sounds / Voice Sounds] : lungs were clear to auscultation bilaterally [Exaggerated Use Of Accessory Muscles For Inspiration] : no accessory muscle use [Decreased Breath Sounds] : decreased breath sounds [Abdomen Tenderness] : non-tender [Abdomen Soft] : soft [Abdomen Mass (___ Cm)] : no abdominal mass palpated [Cyanosis, Localized] : no localized cyanosis [Gait - Sufficient For Exercise Testing] : the gait was sufficient for exercise testing [Nail Clubbing] : no clubbing of the fingernails [Abnormal Walk] : normal gait [Petechial Hemorrhages (___cm)] : no petechial hemorrhages [Skin Color & Pigmentation] : normal skin color and pigmentation [Skin Lesions] : no skin lesions [No Venous Stasis] : no venous stasis [] : no rash [No Skin Ulcers] : no skin ulcer [No Xanthoma] : no  xanthoma was observed [Deep Tendon Reflexes (DTR)] : deep tendon reflexes were 2+ and symmetric [No Focal Deficits] : no focal deficits [Sensation] : the sensory exam was normal to light touch and pinprick

## 2019-11-08 NOTE — HISTORY OF PRESENT ILLNESS
[Difficulty Breathing During Exertion] : stable dyspnea on exertion [Doing Well] : doing well [Wheezing] : denies wheezing [Feelings Of Weakness On Exertion] : stable exercise intolerance [Cough] : denies coughing [Regional Soft Tissue Swelling Both Lower Extremities] : denies lower extremity edema [___ Times a Day] : of [unfilled] time(s) a day [None] : None [Adherent] : the patient is adherent with ~his/her~ medication regimen [de-identified] : group 3 pulm HTN [de-identified] : maintained 2lpm continuous at home  and 2lpm pulsed with exercise [de-identified] : dental issues [FreeTextEntry1] : Can not tolerate BIPAP with ear pain

## 2019-11-08 NOTE — DISCUSSION/SUMMARY
[COPD] : chronic obstructive pulmonary disease [Oxygen-Dependent] : oxygen-dependent [Stable] : stable [Patient] : the patient [None] : There are no changes in medication management [Stage IV (Very Severe)] : stage IV (very severe) [de-identified] : FATUMA CHRISTENSENAP

## 2019-11-08 NOTE — CONSULT LETTER
[Dear  ___] : Dear  [unfilled], [Sincerely,] : Sincerely, [Please see my note below.] : Please see my note below. [Consult Letter:] : I had the pleasure of evaluating your patient, [unfilled]. [FreeTextEntry3] : Wilbert Castellano MD FCCP\par Pulmonary/Critical Care/Sleep Medicine\par Department of Internal Medicine\par \par [facility] [employer]\par

## 2019-12-18 ENCOUNTER — NON-APPOINTMENT (OUTPATIENT)
Age: 79
End: 2019-12-18

## 2019-12-18 ENCOUNTER — APPOINTMENT (OUTPATIENT)
Dept: CARDIOLOGY | Facility: CLINIC | Age: 79
End: 2019-12-18
Payer: MEDICARE

## 2019-12-18 VITALS
HEART RATE: 78 BPM | HEIGHT: 57 IN | RESPIRATION RATE: 14 BRPM | BODY MASS INDEX: 19.41 KG/M2 | SYSTOLIC BLOOD PRESSURE: 122 MMHG | WEIGHT: 90 LBS | DIASTOLIC BLOOD PRESSURE: 64 MMHG

## 2019-12-18 PROCEDURE — 99214 OFFICE O/P EST MOD 30 MIN: CPT

## 2019-12-18 PROCEDURE — 93000 ELECTROCARDIOGRAM COMPLETE: CPT

## 2019-12-18 RX ORDER — LEVALBUTEROL TARTRATE 45 UG/1
45 AEROSOL, METERED ORAL
Refills: 5 | Status: DISCONTINUED | COMMUNITY
Start: 2017-07-25 | End: 2019-12-18

## 2019-12-18 NOTE — HISTORY OF PRESENT ILLNESS
[FreeTextEntry1] : Unfortunately, she is now stage IV COPD and at times has "attacks of dyspnea";\par \par She's been recommended at times to slow down the O2 to allow pulse oximetry to drop to low 90s;

## 2019-12-18 NOTE — ASSESSMENT
[FreeTextEntry1] : ; EKG demonstrates normal sinus rhythm with poor R wave V1 to V2 and no acute changes\par \par plan:\par \par ;One transthoracic echo and carotid duplex study by next visit within 4-5 months\par \par Continue current medical regimen;\par \par Patient encouraged to follow pulmonary guidelines and revisit; with primary care in the near future\par \par

## 2019-12-18 NOTE — REASON FOR VISIT
[Follow-Up - Clinic] : a clinic follow-up of [FreeTextEntry1] : The patient is a very pleasant 78-year-old white female with a known history for chronic advanced COPD who is O2 nasal cannula dependent with history for occasional palpitations and tachycardia who presents back to the office today for general cardiac checkup;\par \par Fortunately she has had no significant symptoms of chest pain, recent palpitations or dizziness;\par \par

## 2019-12-18 NOTE — PHYSICAL EXAM
[Normal Appearance] : normal appearance [General Appearance - Well Developed] : well developed [Well Groomed] : well groomed [No Deformities] : no deformities [General Appearance - Well Nourished] : well nourished [Normal Conjunctiva] : the conjunctiva exhibited no abnormalities [Eyelids - No Xanthelasma] : the eyelids demonstrated no xanthelasmas [Normal Oral Mucosa] : normal oral mucosa [No Oral Pallor] : no oral pallor [No Oral Cyanosis] : no oral cyanosis [Normal Jugular Venous V Waves Present] : normal jugular venous V waves present [Normal Jugular Venous A Waves Present] : normal jugular venous A waves present [No Jugular Venous Olivares A Waves] : no jugular venous olivares A waves [Abdomen Soft] : soft [Abdomen Tenderness] : non-tender [Abnormal Walk] : normal gait [] : no hepato-splenomegaly [Abdomen Mass (___ Cm)] : no abdominal mass palpated [Nail Clubbing] : no clubbing of the fingernails [Gait - Sufficient For Exercise Testing] : the gait was sufficient for exercise testing [Oriented To Time, Place, And Person] : oriented to person, place, and time [Cyanosis, Localized] : no localized cyanosis [Affect] : the affect was normal [Mood] : the mood was normal [No Anxiety] : not feeling anxious [FreeTextEntry1] : See above noted

## 2020-01-01 ENCOUNTER — APPOINTMENT (OUTPATIENT)
Dept: PULMONOLOGY | Facility: CLINIC | Age: 80
End: 2020-01-01
Payer: MEDICARE

## 2020-01-01 VITALS
DIASTOLIC BLOOD PRESSURE: 68 MMHG | BODY MASS INDEX: 17.08 KG/M2 | HEIGHT: 60 IN | WEIGHT: 87 LBS | OXYGEN SATURATION: 95 % | SYSTOLIC BLOOD PRESSURE: 138 MMHG | RESPIRATION RATE: 14 BRPM | HEART RATE: 77 BPM

## 2020-01-01 PROCEDURE — 99214 OFFICE O/P EST MOD 30 MIN: CPT

## 2020-01-01 PROCEDURE — 99072 ADDL SUPL MATRL&STAF TM PHE: CPT

## 2020-02-12 ENCOUNTER — APPOINTMENT (OUTPATIENT)
Dept: PULMONOLOGY | Facility: CLINIC | Age: 80
End: 2020-02-12
Payer: MEDICARE

## 2020-02-12 VITALS — DIASTOLIC BLOOD PRESSURE: 60 MMHG | SYSTOLIC BLOOD PRESSURE: 110 MMHG

## 2020-02-12 VITALS — HEART RATE: 87 BPM | OXYGEN SATURATION: 93 %

## 2020-02-12 PROCEDURE — 99214 OFFICE O/P EST MOD 30 MIN: CPT

## 2020-02-12 NOTE — DISCUSSION/SUMMARY
[Oxygen-Dependent] : oxygen-dependent [COPD] : chronic obstructive pulmonary disease [Stage IV (Very Severe)] : stage IV (very severe) [Stable] : stable [Patient] : the patient [Supplemental Oxygen] : supplemental oxygen [de-identified] : Patient counseled on the importance of minimizing his saturation and maintaining a saturation between 88-92% to prevent oxygen associated hypercapnia

## 2020-02-12 NOTE — PHYSICAL EXAM
[No Acute Distress] : no acute distress [Normal Oropharynx] : normal oropharynx [Normal Appearance] : normal appearance [No Neck Mass] : no neck mass [Normal Rate/Rhythm] : normal rate/rhythm [No Resp Distress] : no resp distress [No Murmurs] : no murmurs [Normal S1, S2] : normal s1, s2 [Clear to Auscultation Bilaterally] : clear to auscultation bilaterally [No Abnormalities] : no abnormalities [Benign] : benign [Normal Gait] : normal gait [No Clubbing] : no clubbing [No Cyanosis] : no cyanosis [No Edema] : no edema [FROM] : FROM [No Focal Deficits] : no focal deficits [Normal Color/ Pigmentation] : normal color/ pigmentation [Oriented x3] : oriented x3 [Normal Affect] : normal affect

## 2020-02-12 NOTE — CONSULT LETTER
[Dear  ___] : Dear  [unfilled], [Please see my note below.] : Please see my note below. [Sincerely,] : Sincerely, [Consult Letter:] : I had the pleasure of evaluating your patient, [unfilled]. [FreeTextEntry3] : Wilbert Castellano MD FCCP\par Pulmonary/Critical Care/Sleep Medicine\par Department of Internal Medicine\par \par [facility]\par

## 2020-02-12 NOTE — HISTORY OF PRESENT ILLNESS
[Doing Well] : doing well [Difficulty Breathing During Exertion] : stable dyspnea on exertion [Feelings Of Weakness On Exertion] : stable exercise intolerance [Cough] : denies coughing [Regional Soft Tissue Swelling Both Lower Extremities] : denies lower extremity edema [___ Times a Day] : of [unfilled] time(s) a day [Wheezing] : denies wheezing [None] : None [Adherent] : the patient is adherent with ~his/her~ medication regimen [Class III - Marked Limitation in Activity] : III [de-identified] : Pt maintains sats >90% on room air at rest [de-identified] : group 3 pulm HTN

## 2020-05-01 NOTE — ED PROVIDER NOTE - CROS ED CONS ALL NEG
L shoulder separation - asked ortho for eval
L shoulder separation - asked ortho for eval   consult noted and they will f/u as outpt
negative...

## 2020-06-22 ENCOUNTER — APPOINTMENT (OUTPATIENT)
Dept: PULMONOLOGY | Facility: CLINIC | Age: 80
End: 2020-06-22
Payer: MEDICARE

## 2020-06-22 VITALS
OXYGEN SATURATION: 96 % | WEIGHT: 90 LBS | HEART RATE: 82 BPM | BODY MASS INDEX: 19.48 KG/M2 | SYSTOLIC BLOOD PRESSURE: 110 MMHG | DIASTOLIC BLOOD PRESSURE: 60 MMHG

## 2020-06-22 DIAGNOSIS — J44.1 CHRONIC OBSTRUCTIVE PULMONARY DISEASE WITH (ACUTE) EXACERBATION: ICD-10-CM

## 2020-06-22 PROCEDURE — 99214 OFFICE O/P EST MOD 30 MIN: CPT

## 2020-06-22 NOTE — PHYSICAL EXAM
[No Acute Distress] : no acute distress [Normal Appearance] : normal appearance [Normal Oropharynx] : normal oropharynx [No Neck Mass] : no neck mass [Normal Rate/Rhythm] : normal rate/rhythm [Normal S1, S2] : normal s1, s2 [No Murmurs] : no murmurs [No Resp Distress] : no resp distress [Clear to Auscultation Bilaterally] : clear to auscultation bilaterally [Benign] : benign [Normal Gait] : normal gait [No Abnormalities] : no abnormalities [No Cyanosis] : no cyanosis [No Clubbing] : no clubbing [No Edema] : no edema [Normal Color/ Pigmentation] : normal color/ pigmentation [FROM] : FROM [Oriented x3] : oriented x3 [Normal Affect] : normal affect [No Focal Deficits] : no focal deficits

## 2020-06-23 NOTE — DISCUSSION/SUMMARY
[FreeTextEntry1] : Stage IV chronic obstructive lung disease with a very chronic respiratory acidosis. No clinical change in her COPD, but recent complaints of daytime somnolence may raise the issue of worsening hypercapnia. Other considerations include beta blockers, as well as her use of anxiolytics. Patient has been advised to reduce the dose of her alprazolam in half, undergo arterial blood gas testing and possibly consider reduction in beta blockers

## 2020-06-23 NOTE — CONSULT LETTER
[Dear  ___] : Dear  [unfilled], [Consult Letter:] : I had the pleasure of evaluating your patient, [unfilled]. [Please see my note below.] : Please see my note below. [Sincerely,] : Sincerely, [FreeTextEntry3] : Wilbert Castellano MD FCCP\par Pulmonary/Critical Care/Sleep Medicine\par Department of Internal Medicine\par \par [facility]\par

## 2020-06-23 NOTE — HISTORY OF PRESENT ILLNESS
[Doing Well] : doing well [Difficulty Breathing During Exertion] : stable dyspnea on exertion [Feelings Of Weakness On Exertion] : stable exercise intolerance [Cough] : denies coughing [Wheezing] : denies wheezing [Regional Soft Tissue Swelling Both Lower Extremities] : denies lower extremity edema [___ Times a Day] : of [unfilled] time(s) a day [None] : None [Adherent] : the patient is adherent with ~his/her~ medication regimen [de-identified] : group 3 pulm HTN [de-identified] : Pt maintains sats >90% on room air at rest. Complains of falling asleep later in the day despite sleeping during the night

## 2020-07-08 ENCOUNTER — APPOINTMENT (OUTPATIENT)
Dept: CARDIOLOGY | Facility: CLINIC | Age: 80
End: 2020-07-08
Payer: MEDICARE

## 2020-07-08 PROCEDURE — 93880 EXTRACRANIAL BILAT STUDY: CPT

## 2020-07-08 PROCEDURE — 93306 TTE W/DOPPLER COMPLETE: CPT

## 2020-08-19 ENCOUNTER — APPOINTMENT (OUTPATIENT)
Dept: CARDIOLOGY | Facility: CLINIC | Age: 80
End: 2020-08-19
Payer: MEDICARE

## 2020-08-19 ENCOUNTER — NON-APPOINTMENT (OUTPATIENT)
Age: 80
End: 2020-08-19

## 2020-08-19 VITALS
HEART RATE: 66 BPM | SYSTOLIC BLOOD PRESSURE: 139 MMHG | DIASTOLIC BLOOD PRESSURE: 72 MMHG | WEIGHT: 88 LBS | HEIGHT: 60 IN | BODY MASS INDEX: 17.28 KG/M2 | RESPIRATION RATE: 14 BRPM | TEMPERATURE: 97.4 F

## 2020-08-19 PROCEDURE — 99214 OFFICE O/P EST MOD 30 MIN: CPT

## 2020-08-19 PROCEDURE — 93000 ELECTROCARDIOGRAM COMPLETE: CPT

## 2020-08-19 NOTE — REASON FOR VISIT
[Follow-Up - Clinic] : a clinic follow-up of [FreeTextEntry1] : The patient is an 79-year-old white female with a known history of advanced COPD, stage IV, O2 nasal cannula dependent who has a history in the past and occasional palpitations and tachycardia. Patient returns to the office today accompanied with her ;\par \par She is under the care of Dr. Castellano from the pulmonary;\par \par Patient has had recent evidence for hypercapnia and some daytime somnolence at times and was recommended by pulmonary to consider cutting back a little bit on Xanax;\par \par Otherwise, she has had no significant chest pain, palpitations, or syncope;\par \par She gets occasional lightheadedness/dizziness and has been trying to get out a little bit for some fresh air and walks-but mostly staying home bound during the corona virus pandemic;

## 2020-08-19 NOTE — PHYSICAL EXAM
[General Appearance - Well Developed] : well developed [Well Groomed] : well groomed [General Appearance - Well Nourished] : well nourished [No Deformities] : no deformities [General Appearance - In No Acute Distress] : no acute distress [Normal Conjunctiva] : the conjunctiva exhibited no abnormalities [Eyelids - No Xanthelasma] : the eyelids demonstrated no xanthelasmas [No Oral Pallor] : no oral pallor [Normal Oral Mucosa] : normal oral mucosa [No Oral Cyanosis] : no oral cyanosis [Normal Jugular Venous A Waves Present] : normal jugular venous A waves present [No Jugular Venous Olivares A Waves] : no jugular venous olivares A waves [Normal Jugular Venous V Waves Present] : normal jugular venous V waves present [Abdomen Soft] : soft [Abdomen Tenderness] : non-tender [] : no hepato-splenomegaly [Abdomen Mass (___ Cm)] : no abdominal mass palpated [Abnormal Walk] : normal gait [Gait - Sufficient For Exercise Testing] : the gait was sufficient for exercise testing [Nail Clubbing] : no clubbing of the fingernails [Cyanosis, Localized] : no localized cyanosis [FreeTextEntry1] : Left lower extremity a circumscribed area of erythema with dry skin noted-- but improving; [Oriented To Time, Place, And Person] : oriented to person, place, and time [Affect] : the affect was normal [Mood] : the mood was normal [No Anxiety] : not feeling anxious

## 2020-08-19 NOTE — ASSESSMENT
[FreeTextEntry1] : EKG demonstrating normal sinus rhythm at a rate of 66 with poor R-wave progression V1 to V3; Baseline artifact;\par \par \par In summary this 79-year-old white female has a history of advanced COPD/stage IV with O2 nasal cannula dependent and periodic hypercapnia with prior history of palpitations now controlled on current medical regimen;\par \par Plan:\par \par No additional cardiac workup indicated at this time\par \par We'll continue to monitor patient for chest symptoms;\par \par Recommend continue current medical regimen\par \par Patient to report any worsening dyspnea or chest discomfort to this office or pulmonary;  primary care;\par \par Followup within 5-6 months or p.r.n.;;;

## 2020-08-19 NOTE — HISTORY OF PRESENT ILLNESS
[FreeTextEntry1] : She is tolerating her current medical regimen including atenolol 25 mg daily;\par \par Patient underwent recent cardiac testing including echocardiogram and carotid duplex study;\par \par Echo study from 7/8/20 demonstrates normal cardiac chamber sizes with normal systolic function of LV. LVEF in range of 60% with some element of diastolic dysfunction;\par There was mild to moderate MR and no definite reading on pulmonary artery pressures were obtained;\par \par Carotid duplex study from 7/8/20 demonstrated bilateral mild carotid plaquing without any significant obstructive flow;

## 2021-01-01 ENCOUNTER — EMERGENCY (EMERGENCY)
Facility: HOSPITAL | Age: 81
LOS: 1 days | Discharge: HOME | End: 2021-01-01
Attending: EMERGENCY MEDICINE
Payer: MEDICARE

## 2021-01-01 ENCOUNTER — APPOINTMENT (OUTPATIENT)
Dept: PULMONOLOGY | Facility: CLINIC | Age: 81
End: 2021-01-01
Payer: MEDICARE

## 2021-01-01 ENCOUNTER — APPOINTMENT (OUTPATIENT)
Dept: CARDIOLOGY | Facility: CLINIC | Age: 81
End: 2021-01-01
Payer: MEDICARE

## 2021-01-01 ENCOUNTER — NON-APPOINTMENT (OUTPATIENT)
Age: 81
End: 2021-01-01

## 2021-01-01 ENCOUNTER — APPOINTMENT (OUTPATIENT)
Dept: DISASTER EMERGENCY | Facility: CLINIC | Age: 81
End: 2021-01-01

## 2021-01-01 ENCOUNTER — RX CHANGE (OUTPATIENT)
Age: 81
End: 2021-01-01

## 2021-01-01 ENCOUNTER — OUTPATIENT (OUTPATIENT)
Dept: OUTPATIENT SERVICES | Facility: HOSPITAL | Age: 81
LOS: 1 days | End: 2021-01-01
Payer: MEDICARE

## 2021-01-01 VITALS — BODY MASS INDEX: 17.14 KG/M2 | HEIGHT: 59 IN | TEMPERATURE: 98.2 F | WEIGHT: 85 LBS

## 2021-01-01 VITALS
TEMPERATURE: 98 F | HEART RATE: 67 BPM | HEIGHT: 68 IN | RESPIRATION RATE: 18 BRPM | WEIGHT: 214.95 LBS | DIASTOLIC BLOOD PRESSURE: 82 MMHG | OXYGEN SATURATION: 95 % | SYSTOLIC BLOOD PRESSURE: 125 MMHG

## 2021-01-01 VITALS
WEIGHT: 80 LBS | SYSTOLIC BLOOD PRESSURE: 110 MMHG | DIASTOLIC BLOOD PRESSURE: 52 MMHG | RESPIRATION RATE: 15 BRPM | BODY MASS INDEX: 16.13 KG/M2 | HEART RATE: 72 BPM | HEIGHT: 59 IN

## 2021-01-01 VITALS
BODY MASS INDEX: 16.88 KG/M2 | WEIGHT: 86 LBS | DIASTOLIC BLOOD PRESSURE: 60 MMHG | RESPIRATION RATE: 14 BRPM | HEART RATE: 67 BPM | TEMPERATURE: 97 F | SYSTOLIC BLOOD PRESSURE: 110 MMHG | HEIGHT: 60 IN

## 2021-01-01 VITALS
SYSTOLIC BLOOD PRESSURE: 112 MMHG | HEIGHT: 60 IN | DIASTOLIC BLOOD PRESSURE: 58 MMHG | WEIGHT: 84 LBS | TEMPERATURE: 98.6 F | RESPIRATION RATE: 14 BRPM | HEART RATE: 67 BPM | BODY MASS INDEX: 16.49 KG/M2

## 2021-01-01 VITALS — HEART RATE: 66 BPM | OXYGEN SATURATION: 93 % | RESPIRATION RATE: 16 BRPM

## 2021-01-01 VITALS — SYSTOLIC BLOOD PRESSURE: 120 MMHG | DIASTOLIC BLOOD PRESSURE: 60 MMHG

## 2021-01-01 VITALS
HEIGHT: 59 IN | DIASTOLIC BLOOD PRESSURE: 70 MMHG | WEIGHT: 80 LBS | RESPIRATION RATE: 15 BRPM | OXYGEN SATURATION: 93 % | SYSTOLIC BLOOD PRESSURE: 122 MMHG | BODY MASS INDEX: 16.13 KG/M2 | HEART RATE: 70 BPM

## 2021-01-01 DIAGNOSIS — I70.0 ATHEROSCLEROSIS OF AORTA: ICD-10-CM

## 2021-01-01 DIAGNOSIS — J44.9 CHRONIC OBSTRUCTIVE PULMONARY DISEASE, UNSPECIFIED: ICD-10-CM

## 2021-01-01 DIAGNOSIS — Z99.81 DEPENDENCE ON SUPPLEMENTAL OXYGEN: ICD-10-CM

## 2021-01-01 DIAGNOSIS — I65.29 OCCLUSION AND STENOSIS OF UNSPECIFIED CAROTID ARTERY: ICD-10-CM

## 2021-01-01 DIAGNOSIS — Z87.898 PERSONAL HISTORY OF OTHER SPECIFIED CONDITIONS: ICD-10-CM

## 2021-01-01 DIAGNOSIS — R42 DIZZINESS AND GIDDINESS: ICD-10-CM

## 2021-01-01 DIAGNOSIS — J96.12 CHRONIC RESPIRATORY FAILURE WITH HYPERCAPNIA: ICD-10-CM

## 2021-01-01 DIAGNOSIS — Z01.818 ENCOUNTER FOR OTHER PREPROCEDURAL EXAMINATION: ICD-10-CM

## 2021-01-01 DIAGNOSIS — I27.20 PULMONARY HYPERTENSION, UNSPECIFIED: ICD-10-CM

## 2021-01-01 DIAGNOSIS — I38 ENDOCARDITIS, VALVE UNSPECIFIED: ICD-10-CM

## 2021-01-01 LAB
RAPID RVP RESULT: DETECTED
RV+EV RNA SPEC QL NAA+PROBE: DETECTED
SARS-COV-2 RNA SPEC QL NAA+PROBE: SIGNIFICANT CHANGE UP

## 2021-01-01 PROCEDURE — 99214 OFFICE O/P EST MOD 30 MIN: CPT

## 2021-01-01 PROCEDURE — 99285 EMERGENCY DEPT VISIT HI MDM: CPT

## 2021-01-01 PROCEDURE — 99072 ADDL SUPL MATRL&STAF TM PHE: CPT

## 2021-01-01 PROCEDURE — 93000 ELECTROCARDIOGRAM COMPLETE: CPT

## 2021-01-01 PROCEDURE — 99214 OFFICE O/P EST MOD 30 MIN: CPT | Mod: 25

## 2021-01-01 PROCEDURE — 82803 BLOOD GASES ANY COMBINATION: CPT

## 2021-01-01 PROCEDURE — 94010 BREATHING CAPACITY TEST: CPT

## 2021-01-01 PROCEDURE — 0225U NFCT DS DNA&RNA 21 SARSCOV2: CPT

## 2021-01-01 PROCEDURE — 99291 CRITICAL CARE FIRST HOUR: CPT

## 2021-01-01 RX ORDER — TIOTROPIUM BROMIDE AND OLODATEROL 3.124; 2.736 UG/1; UG/1
2.5-2.5 SPRAY, METERED RESPIRATORY (INHALATION) DAILY
Qty: 3 | Refills: 3 | Status: ACTIVE | COMMUNITY
Start: 2017-06-14 | End: 1900-01-01

## 2021-01-01 RX ORDER — UMECLIDINIUM BROMIDE AND VILANTEROL TRIFENATATE 62.5; 25 UG/1; UG/1
62.5-25 POWDER RESPIRATORY (INHALATION) DAILY
Qty: 1 | Refills: 5 | Status: DISCONTINUED | COMMUNITY
Start: 2021-01-01 | End: 2021-01-01

## 2021-01-01 RX ORDER — FLUTICASONE FUROATE, UMECLIDINIUM BROMIDE AND VILANTEROL TRIFENATATE 100; 62.5; 25 UG/1; UG/1; UG/1
100-62.5-25 POWDER RESPIRATORY (INHALATION) DAILY
Qty: 1 | Refills: 5 | Status: ACTIVE | COMMUNITY
Start: 2021-01-01 | End: 1900-01-01

## 2021-01-25 PROBLEM — I38 VALVULAR INSUFFICIENCY: Status: ACTIVE | Noted: 2018-02-09

## 2021-01-25 PROBLEM — Z87.898 HISTORY OF TACHYCARDIA: Status: RESOLVED | Noted: 2018-02-09 | Resolved: 2021-01-01

## 2021-01-25 NOTE — REVIEW OF SYSTEMS
[Dyspnea on exertion] : dyspnea during exertion [Palpitations] : palpitations [Dizziness] : dizziness [Memory Lapses Or Loss] : memory lapses or loss [Negative] : Heme/Lymph

## 2021-01-25 NOTE — PHYSICAL EXAM
[General Appearance - Well Developed] : well developed [Well Groomed] : well groomed [General Appearance - Well Nourished] : well nourished [General Appearance - In No Acute Distress] : no acute distress [No Deformities] : no deformities [Normal Conjunctiva] : the conjunctiva exhibited no abnormalities [Eyelids - No Xanthelasma] : the eyelids demonstrated no xanthelasmas [Normal Oral Mucosa] : normal oral mucosa [No Oral Pallor] : no oral pallor [No Oral Cyanosis] : no oral cyanosis [Normal Jugular Venous A Waves Present] : normal jugular venous A waves present [Normal Jugular Venous V Waves Present] : normal jugular venous V waves present [No Jugular Venous Olivares A Waves] : no jugular venous olivares A waves [Abdomen Soft] : soft [Abdomen Tenderness] : non-tender [Abdomen Mass (___ Cm)] : no abdominal mass palpated [Abnormal Walk] : normal gait [Gait - Sufficient For Exercise Testing] : the gait was sufficient for exercise testing [Nail Clubbing] : no clubbing of the fingernails [Cyanosis, Localized] : no localized cyanosis [Skin Color & Pigmentation] : normal skin color and pigmentation [] : no ischemic changes [FreeTextEntry1] : See above noted [Oriented To Time, Place, And Person] : oriented to person, place, and time [Affect] : the affect was normal [Mood] : the mood was normal [No Anxiety] : not feeling anxious

## 2021-01-25 NOTE — HISTORY OF PRESENT ILLNESS
[FreeTextEntry1] : Patient does recall recently having some brief memory lapses and is not sure whether this is secondary to hypercapnia or early dementia etc.;\par \par She will be seeking out attention from a neurologist in the near future;

## 2021-01-25 NOTE — REASON FOR VISIT
[Follow-Up - Clinic] : a clinic follow-up of [FreeTextEntry1] : The patient is an 81-year-old white female with advanced age for COPD and O2 nasal cannula dependent with history of occasional palpitations in the chest and occasional tachycardia and mild peripheral arterial disease;\par \par She presents back to the office today accompanied with her  for general cardiac checkup\par \par Patient reports that her condition has been overall stable however pulmonary is looking into getting an additional machine to help her CO2;\par \par There is been no chest pain, dizziness or syncope;;

## 2021-01-25 NOTE — ASSESSMENT
[FreeTextEntry1] : EKG demonstrating normal sinus rhythm at a rate of 67 late R wave transition V1 to V2 but otherwise no acute changes\par \par In summary this 80-year-old woman with advanced COPD/stage IV O2 nasal cannula dependent with occasional intermittent palpitations of the chest fairly well controlled on low dose atenolol--has had a stable cardiac pattern;\par \par Plan:\par \par Awaiting further pulmonary treatments with Dr. Castellano and numerous sheen\par \par Patient report any untoward worsening palpitations or chest discomfort between now and next visit within 3-4 months\par \par Continue current medical regimen\par \par Followup within 4-5 months or p.r.n.;;;;;

## 2021-02-11 NOTE — CONSULT LETTER
[Dear  ___] : Dear  [unfilled], [Consult Letter:] : I had the pleasure of evaluating your patient, [unfilled]. [Please see my note below.] : Please see my note below. [Sincerely,] : Sincerely, [FreeTextEntry3] : Wilbert Castellano MD FCCP\par Pulmonary/Critical Care/Sleep Medicine\par Department of Internal Medicine\par \par [facility]\par

## 2021-02-11 NOTE — HISTORY OF PRESENT ILLNESS
[Doing Well] : doing well [Difficulty Breathing During Exertion] : stable dyspnea on exertion [Feelings Of Weakness On Exertion] : stable exercise intolerance [Cough] : denies coughing [Wheezing] : denies wheezing [Regional Soft Tissue Swelling Both Lower Extremities] : denies lower extremity edema [___ Times a Day] : of [unfilled] time(s) a day [None] : None [Adherent] : the patient is adherent with ~his/her~ medication regimen [Former] : former [>= 30 pack years] : >= 30 pack years [Group III - Pulmonary HTN Associated with Hypoxemia] : Group III - Pulmonary HTN Associated with Hypoxemia: [COPD] : COPD [de-identified] : group 3 pulm HTN [de-identified] : keeping O2  at sats 90-92% [de-identified] : Pt maintains sats >90% on room air at rest. Complains of falling asleep later in the day despite sleeping during the night

## 2021-02-11 NOTE — ADDENDUM
[FreeTextEntry1] : Patient has chronic respiratory failure, secondary to COPD. BiPAP with a backup rate has really been tried and failed due to the severity of the patient's condition. Life 2000 no invasive ventilation is being ordered to achieve augmented target tidal volumes in 3 different levels of activity to minimize further decline and the likelihood of hospitalization, and death.

## 2021-02-11 NOTE — DISCUSSION/SUMMARY
[COPD] : chronic obstructive pulmonary disease [Oxygen-Dependent] : oxygen-dependent [Stable] : stable [Deteriorating] : deteriorating [Responding to Treatment] : responding to treatment [Stage IV (Very Severe)] : stage IV (very severe) [None] : There are no changes in medication management [de-identified] : With hypercapnic respiratory failure [de-identified] : Possible NIV therapy

## 2021-06-18 PROBLEM — Z01.818 PREOP TESTING: Status: ACTIVE | Noted: 2021-01-01

## 2021-06-21 PROBLEM — I65.29 CAROTID ARTERY STENOSIS: Status: ACTIVE | Noted: 2019-12-18

## 2021-06-21 PROBLEM — I27.20 PULMONARY HYPERTENSION: Status: ACTIVE | Noted: 2018-02-09

## 2021-06-22 NOTE — CONSULT LETTER
[Dear  ___] : Dear  [unfilled], [Consult Letter:] : I had the pleasure of evaluating your patient, [unfilled]. [Please see my note below.] : Please see my note below. [Sincerely,] : Sincerely, [FreeTextEntry3] : Wilbert Castellano MD FCCP\par Pulmonary/Critical Care/Sleep Medicine\par Department of Internal Medicine\par \par [facility]\par

## 2021-06-22 NOTE — DISCUSSION/SUMMARY
[COPD] : chronic obstructive pulmonary disease [Oxygen-Dependent] : oxygen-dependent [Stable] : stable [Responding to Treatment] : responding to treatment [Stage IV (Very Severe)] : stage IV (very severe) [Supplemental Oxygen] : supplemental oxygen [Pulmonary Rehabilitation] : pulmonary rehabilitation [de-identified] : With hypercapnic respiratory failure

## 2021-06-24 NOTE — REVIEW OF SYSTEMS
[Weight Loss (___ Lbs)] : [unfilled] ~Ulb weight loss [SOB] : shortness of breath [Dyspnea on exertion] : dyspnea during exertion [Change in Appetite] : change in appetite [Anxiety] : anxiety [Negative] : Heme/Lymph

## 2021-06-24 NOTE — PHYSICAL EXAM
[Well Developed] : well developed [No Acute Distress] : no acute distress [Frail] : frail [Normal Conjunctiva] : normal conjunctiva [Normal Venous Pressure] : normal venous pressure [No Carotid Bruit] : no carotid bruit [Normal S1, S2] : normal S1, S2 [No Murmur] : no murmur [No Rub] : no rub [No Gallop] : no gallop [No Respiratory Distress] : no respiratory distress  [Soft] : abdomen soft [Non Tender] : non-tender [No Masses/organomegaly] : no masses/organomegaly [Normal Gait] : normal gait [Normal Bowel Sounds] : normal bowel sounds [Normal] : no edema, no cyanosis, no clubbing, no varicosities [No Cyanosis] : no cyanosis [No Clubbing] : no clubbing [No Varicosities] : no varicosities [No Rash] : no rash [No Skin Lesions] : no skin lesions [Moves all extremities] : moves all extremities [No Focal Deficits] : no focal deficits [Normal Speech] : normal speech [Alert and Oriented] : alert and oriented [Normal memory] : normal memory [de-identified] : using O2 nasal cannula and portable oxygen [de-identified] : severely decreased breath sounds bilaterally with some scant dry crackles [de-identified] : mild varicosities of the lower extremities and feet with trace ankle edema

## 2021-06-24 NOTE — HISTORY OF PRESENT ILLNESS
[FreeTextEntry1] : She states she's been taking her medications regularly, including atenolol 25 mg daily;\par \par Patient has been very sedentary and mostly is homebound;\par \par

## 2021-06-24 NOTE — REASON FOR VISIT
[Arrhythmia/ECG Abnorrmalities] : arrhythmia/ECG abnormalities [Other: ____] : [unfilled] [Spouse] : spouse [FreeTextEntry3] : CHYNA CRESPO [FreeTextEntry1] : The patient is a 80-year-old white female with long-standing history for advanced COPD and O2 nasal cannula dependent who has had some periodic tachycardia in the past and mild peripheral arterial disease.;\par \par She presents back to the office for general cardiac checkup and is accompanied by her ;\par \par Patient reports that she gets occasional twinges of chest discomfort that is not particularly exertional related; also, she reports her balance has been somewhat off she gets up too quickly or tries to ambulate;\par \par She denies any recent palpitations, or syncope;

## 2021-08-05 NOTE — DISCUSSION/SUMMARY
Detail Level: Detailed [COPD] : chronic obstructive pulmonary disease [Oxygen-Dependent] : oxygen-dependent [None] : There are no changes in medication management [Stable] : stable [Stage IV (Very Severe)] : stage IV (very severe) [Responding to Treatment] : responding to treatment [de-identified] : Hypercapnic resp failure [Patient] : the patient [de-identified] : Trilogy NIV [FreeTextEntry1] : 78-year-old female with a history of hypercapnic artery failure, secondary to end-stage COPD. The patient will be started on noninvasive ventilation as per previously discussed, and now recertified. This modality is meant to preserve life and to prevent hospitalizations. Patient agrees with therapy. No change in pulmonary meds

## 2021-10-21 PROBLEM — Z99.81 OXYGEN DEPENDENT: Status: ACTIVE | Noted: 2019-08-09

## 2021-10-21 PROBLEM — J96.12 CHRONIC RESPIRATORY FAILURE WITH HYPERCAPNIA: Status: ACTIVE | Noted: 2019-04-04

## 2021-10-21 NOTE — CONSULT LETTER
[Dear  ___] : Dear  [unfilled], [Consult Letter:] : I had the pleasure of evaluating your patient, [unfilled]. [Please see my note below.] : Please see my note below. [Sincerely,] : Sincerely, [FreeTextEntry3] : Wilbert Castellano MD FCCP\par Pulmonary/Critical Care/Sleep Medicine\par Department of Internal Medicine\par \par [facility]\par

## 2021-10-21 NOTE — DISCUSSION/SUMMARY
[COPD] : chronic obstructive pulmonary disease [Oxygen-Dependent] : oxygen-dependent [Stable] : stable [Responding to Treatment] : responding to treatment [Stage IV (Very Severe)] : stage IV (very severe) [Supplemental Oxygen] : supplemental oxygen [Pulmonary Rehabilitation] : pulmonary rehabilitation [Patient] : the patient [de-identified] : generally failing with depression [de-identified] : With hypercapnic respiratory failure [de-identified] : encouraged to increase diet and decrease carbs

## 2021-10-21 NOTE — HISTORY OF PRESENT ILLNESS
[Former] : former [>= 30 pack years] : >= 30 pack years [Group III - Pulmonary HTN Associated with Hypoxemia] : Group III - Pulmonary HTN Associated with Hypoxemia: [COPD] : COPD [Class III - Marked Limitation in Activity] : III [Doing Well] : doing well [Difficulty Breathing During Exertion] : stable dyspnea on exertion [Feelings Of Weakness On Exertion] : stable exercise intolerance [Cough] : denies coughing [Wheezing] : denies wheezing [Regional Soft Tissue Swelling Both Lower Extremities] : denies lower extremity edema [___ Times a Day] : of [unfilled] time(s) a day [None] : None [Adherent] : the patient is adherent with ~his/her~ medication regimen [de-identified] : group 3 pulm HTN, moderate MR [de-identified] : keeping O2  at sats 90-92% [FreeTextEntry3] : continues to loss wt [de-identified] : Pt maintains sats >90% on room air at rest. Using O2 predominantly with exercise

## 2021-11-01 PROBLEM — I70.0 ATHEROSCLEROSIS OF AORTA: Status: ACTIVE | Noted: 2018-03-19

## 2021-11-01 PROBLEM — R42 DIZZINESS: Status: ACTIVE | Noted: 2018-02-09

## 2021-11-01 NOTE — HISTORY OF PRESENT ILLNESS
[FreeTextEntry1] : Patient sustained a fall recently after tripping on something in the house but x-rays were taken and thus far no reports of any serious injury;\par \par

## 2021-11-01 NOTE — REVIEW OF SYSTEMS
[SOB] : shortness of breath [Dyspnea on exertion] : dyspnea during exertion [Cough] : cough [Negative] : Heme/Lymph

## 2021-11-01 NOTE — ASSESSMENT
[FreeTextEntry1] : EKG demonstrated normal sinus rhythm at a rate of 72 with biatrial enlargement;\par \par \par In summary this 80-year-old woman has a history of advanced COPD, O2 nasal cannula dependent and occasional palpitations in the chest but otherwise stable hemodynamic and cardiac pattern;\par \par Plan:\par \par Patient encouraged to maintain better nutritionally sound diet;\par \par ; No additional cardiac workup indicated at this time\par \par ; Have offered cardiac monitor if patient continues to get any change in her palpitations and symptoms\par \par Return to office within 3-4 months or p.r.n.;

## 2021-11-01 NOTE — PHYSICAL EXAM
[No Acute Distress] : no acute distress [Frail] : frail [Normal Conjunctiva] : normal conjunctiva [Normal Venous Pressure] : normal venous pressure [No Carotid Bruit] : no carotid bruit [Normal S1, S2] : normal S1, S2 [No Murmur] : no murmur [No Rub] : no rub [No Gallop] : no gallop [Soft] : abdomen soft [Non Tender] : non-tender [No Masses/organomegaly] : no masses/organomegaly [Normal Bowel Sounds] : normal bowel sounds [Normal Gait] : normal gait [No Edema] : no edema [No Cyanosis] : no cyanosis [No Clubbing] : no clubbing [No Varicosities] : no varicosities [No Rash] : no rash [No Skin Lesions] : no skin lesions [Moves all extremities] : moves all extremities [No Focal Deficits] : no focal deficits [Normal Speech] : normal speech [Alert and Oriented] : alert and oriented [Normal memory] : normal memory [de-identified] : Frail, petite, elderly white female using O2 nasal cannula; [de-identified] : severely diminished breath sounds bilaterally with some scant rhonchi;

## 2021-11-01 NOTE — REASON FOR VISIT
[Arrhythmia/ECG Abnorrmalities] : arrhythmia/ECG abnormalities [Carotid, Aortic and Peripheral Vascular Disease] : carotid, aortic and peripheral vascular disease [Other: ____] : [unfilled] [Spouse] : spouse [FreeTextEntry3] : CHYNA CRESPO [FreeTextEntry1] : The patient has this 80-year-old white female with known history for advanced COPD/nasal cannula and has had some periodic palpitations and tachycardia past peripheral arterial disease;\par \par Presents back to the office today for general cardiac checkup. She is accompanied with her ;\par \par Although patient has had a stable cardiac pattern with infrequent fleeting palpitations in the chest, she clearly reports that her shortness of breath has progressed over the past several months;\par \par She is currently seeing the pulmonologist (Dr. Castellano), who has been categorized as stage IV;\par \par There's been no chest pain, or syncope but she does get occasional lightheadedness or dizziness;

## 2021-11-02 NOTE — ED ADULT TRIAGE NOTE - CHIEF COMPLAINT QUOTE
C/o chest pain. Describes as pressure. +Nausea and diarrhea. Hx of PE on Xarelto. BIBEMS s/p cardiac arrest. Witnessed by family. Downtime approcx. 10 minutes. Pt intubated with 7.0 ETT. 5 epi administered by EMS. PEA on arrival. Hx of COPD and CHF.

## 2021-11-02 NOTE — ED PROVIDER NOTE - OBJECTIVE STATEMENT
57 y/o F pt with unknown pmhx presenting today as code blue. Per EMS pt was heard to have fallen in bathroom,  ran up to see the pt immediately after and called 911. No cpr started immediately because  felt the pt was still breathing. EMS arrived after pt had been down approx 10 minutes, pt found to be in asystole. cpr started immediately, pt intubated in the field, rhythm transitioned to pea and pt has remained in pea since. Was given 5 epi without change in rhythm.

## 2021-11-02 NOTE — ED PROVIDER NOTE - ATTENDING CONTRIBUTION TO CARE
The patient seen and examined    Cardiac Arrest  Dead On Arrival    I, Mehran Clarke, performed the initial face to face bedside interview with this patient regarding history of present illness, review of symptoms and relevant past medical, social and family history.  I completed an independent physical examination.  I was the initial provider who evaluated this patient. I have signed out the follow up of any pending tests (i.e. labs, radiological studies) to the resident  I have communicated the patient’s plan of care and disposition with the resident. The patient seen and examined    Cardiac Arrest      I, Mehran Clarke, performed the initial face to face bedside interview with this patient regarding history of present illness, review of symptoms and relevant past medical, social and family history.  I completed an independent physical examination.  I was the initial provider who evaluated this patient. I have signed out the follow up of any pending tests (i.e. labs, radiological studies) to the resident  I have communicated the patient’s plan of care and disposition with the resident.

## 2021-11-02 NOTE — ED ADULT TRIAGE NOTE - NS ED TRIAGE AVPU SCALE
Alert-The patient is alert, awake and responds to voice. The patient is oriented to time, place, and person. The triage nurse is able to obtain subjective information. Unresponsive - The patient is nonverbal and does not respond even when a painful stimulus is applied.

## 2021-11-02 NOTE — ED PROVIDER NOTE - PHYSICAL EXAMINATION
General: A&Ox0, GCS 3, unresponsive to noxious stimuli   Cardiac: No auscultative heart beat  Respiratory: Breath sounds bilaterally, no spontaneous respirations  Abd: Soft, no palpable masses   Vascular: No pulse  Neuro: GCS 3  HEENT: Pupils fixed without pupillary response  Skin: Cold

## 2021-11-02 NOTE — ED PROVIDER NOTE - PROGRESS NOTE DETAILS
No signs of life. No corneal, No gag, End tidal 10, No cardiac activity on US.  Pronounced at 7:57 PM

## 2022-02-03 ENCOUNTER — APPOINTMENT (OUTPATIENT)
Dept: CARDIOLOGY | Facility: CLINIC | Age: 82
End: 2022-02-03

## 2022-02-20 ENCOUNTER — APPOINTMENT (OUTPATIENT)
Dept: DISASTER EMERGENCY | Facility: CLINIC | Age: 82
End: 2022-02-20

## 2022-02-24 ENCOUNTER — APPOINTMENT (OUTPATIENT)
Dept: PULMONOLOGY | Facility: CLINIC | Age: 82
End: 2022-02-24

## 2022-09-22 NOTE — ED ADULT NURSE NOTE - NS ED NURSE LEVEL OF CONSCIOUSNESS MENTAL STATUS
Per Dr. Daniels's LOS:    Return for visit with me in 3 months with CBC, Cmp and mag prior.- lab orders entered, pt left without scheduling, PSR placed on recall list.      Awake

## 2023-07-12 NOTE — ED PROVIDER NOTE - NS ED ATTENDING STATEMENT MOD
I have personally provided the amount of critical care time documented below concurrently with the resident/fellow.  This time excludes time spent on separate procedures and time spent teaching. I have reviewed the resident’s / fellow’s documentation and I agree with the history, exam, and assessment and plan of care.
bed rails

## 2023-07-25 NOTE — ED PROVIDER NOTE - CCCP TRG CHIEF CMPLNT
Assumed patient care at 7:00 a.m. on 07/24/2023    I reviewed patient's H and P by Dr. Wood, agree with assessment and plan    Evaluated patient at bedside.  Reports she feels much better.  Tolerated clear liquid and requesting to advance given she is hungry.  Reported she refused KCl p.o. yesterday because she was actively throwing up.  I informed her that I have ordered IV KCl 40 mEq x1 and will give her 1 dose of 40 mEq p.o. to improve her potassium.  Also informed patient that I will repeat her BMP at 4:00 p.m. and will aggressively replace electrolyte if indicated.  Patient verbalized understanding  Discussed her magnesium has improved to 2.7    Patient informs me that she is scheduled for her 1st chemo on Wednesday.  Discuss if her electrolytes remained stable, will discharge her this evening but patient requested to be discharged in morning given transportation    Reviewed her labs again this afternoon, potassium has improved to 3.7, magnesium is 2.7    Plan for discharge in a.m. home    Critical care note:  Critical care diagnosis:  SEVERE HYPOKALEMIA REQUIRING IV KCL  Critical care interventions: Hands-on evaluation, review of labs/radiographs/records and discussion with patient and family if present  Critical care time spent: 35 minutes      Victor Manuel Cadena MD  Department of Hospital Medicine   [facility]   07/24/2023      chest discomfort